# Patient Record
Sex: FEMALE | URBAN - METROPOLITAN AREA
[De-identification: names, ages, dates, MRNs, and addresses within clinical notes are randomized per-mention and may not be internally consistent; named-entity substitution may affect disease eponyms.]

---

## 2023-09-25 ENCOUNTER — APPOINTMENT (RX ONLY)
Dept: URBAN - METROPOLITAN AREA CLINIC 28 | Facility: CLINIC | Age: 78
Setting detail: DERMATOLOGY
End: 2023-09-25

## 2023-09-25 DIAGNOSIS — Z71.89 OTHER SPECIFIED COUNSELING: ICD-10-CM

## 2023-09-25 DIAGNOSIS — L82.1 OTHER SEBORRHEIC KERATOSIS: ICD-10-CM

## 2023-09-25 DIAGNOSIS — D22 MELANOCYTIC NEVI: ICD-10-CM

## 2023-09-25 DIAGNOSIS — L71.8 OTHER ROSACEA: ICD-10-CM

## 2023-09-25 DIAGNOSIS — D18.0 HEMANGIOMA: ICD-10-CM

## 2023-09-25 DIAGNOSIS — L21.8 OTHER SEBORRHEIC DERMATITIS: ICD-10-CM

## 2023-09-25 DIAGNOSIS — Z80.8 FAMILY HISTORY OF MALIGNANT NEOPLASM OF OTHER ORGANS OR SYSTEMS: ICD-10-CM

## 2023-09-25 DIAGNOSIS — Z85.828 PERSONAL HISTORY OF OTHER MALIGNANT NEOPLASM OF SKIN: ICD-10-CM

## 2023-09-25 DIAGNOSIS — L81.4 OTHER MELANIN HYPERPIGMENTATION: ICD-10-CM

## 2023-09-25 DIAGNOSIS — Z12.83 ENCOUNTER FOR SCREENING FOR MALIGNANT NEOPLASM OF SKIN: ICD-10-CM

## 2023-09-25 PROBLEM — D18.01 HEMANGIOMA OF SKIN AND SUBCUTANEOUS TISSUE: Status: ACTIVE | Noted: 2023-09-25

## 2023-09-25 PROBLEM — D22.71 MELANOCYTIC NEVI OF RIGHT LOWER LIMB, INCLUDING HIP: Status: ACTIVE | Noted: 2023-09-25

## 2023-09-25 PROBLEM — D22.5 MELANOCYTIC NEVI OF TRUNK: Status: ACTIVE | Noted: 2023-09-25

## 2023-09-25 PROCEDURE — ? OBSERVATION

## 2023-09-25 PROCEDURE — 99204 OFFICE O/P NEW MOD 45 MIN: CPT

## 2023-09-25 PROCEDURE — ? ADDITIONAL NOTES

## 2023-09-25 PROCEDURE — ? FULL BODY SKIN EXAM

## 2023-09-25 PROCEDURE — ? PRESCRIPTION

## 2023-09-25 PROCEDURE — ? COUNSELING

## 2023-09-25 PROCEDURE — ? SUNSCREEN RECOMMENDATIONS

## 2023-09-25 PROCEDURE — ? PRESCRIPTION MEDICATION MANAGEMENT

## 2023-09-25 RX ORDER — METRONIDAZOLE 7.5 MG/G
1 CREAM TOPICAL BID
Qty: 45 | Refills: 6 | Status: ERX | COMMUNITY
Start: 2023-09-25

## 2023-09-25 RX ORDER — KETOCONAZOLE 20 MG/ML
1 SHAMPOO, SUSPENSION TOPICAL QDAY
Qty: 120 | Refills: 3 | Status: ERX | COMMUNITY
Start: 2023-09-25

## 2023-09-25 RX ADMIN — METRONIDAZOLE 1: 7.5 CREAM TOPICAL at 00:00

## 2023-09-25 RX ADMIN — KETOCONAZOLE 1: 20 SHAMPOO, SUSPENSION TOPICAL at 00:00

## 2023-09-25 ASSESSMENT — LOCATION DETAILED DESCRIPTION DERM
LOCATION DETAILED: SUPERIOR THORACIC SPINE
LOCATION DETAILED: RIGHT MID-UPPER BACK
LOCATION DETAILED: RIGHT MEDIAL 4TH TOE
LOCATION DETAILED: LEFT INFERIOR CENTRAL MALAR CHEEK
LOCATION DETAILED: LEFT MEDIAL FRONTAL SCALP
LOCATION DETAILED: RIGHT SUPERIOR MEDIAL UPPER BACK
LOCATION DETAILED: LEFT CENTRAL PARIETAL SCALP
LOCATION DETAILED: LEFT CLAVICULAR NECK
LOCATION DETAILED: LEFT SUPERIOR UPPER BACK

## 2023-09-25 ASSESSMENT — LOCATION ZONE DERM
LOCATION ZONE: TRUNK
LOCATION ZONE: SCALP
LOCATION ZONE: TOE
LOCATION ZONE: NECK
LOCATION ZONE: FACE

## 2023-09-25 ASSESSMENT — LOCATION SIMPLE DESCRIPTION DERM
LOCATION SIMPLE: RIGHT UPPER BACK
LOCATION SIMPLE: SCALP
LOCATION SIMPLE: LEFT SCALP
LOCATION SIMPLE: UPPER BACK
LOCATION SIMPLE: LEFT UPPER BACK
LOCATION SIMPLE: RIGHT 4TH TOE
LOCATION SIMPLE: LEFT CHEEK
LOCATION SIMPLE: LEFT ANTERIOR NECK

## 2023-09-25 NOTE — PROCEDURE: ADDITIONAL NOTES
Render Risk Assessment In Note?: no
Detail Level: Simple
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

## 2023-09-25 NOTE — PROCEDURE: PRESCRIPTION MEDICATION MANAGEMENT
Detail Level: Zone
Render In Strict Bullet Format?: No
Initiate Treatment: metronidazole 0.75% topical cream: apply thin layer to affected areas on face BID
Initiate Treatment: ketoconazole 2% shampoo: During each hair wash Massage a thin layer onto scalp, let sit for 5-10 minutes then rinse

## 2023-09-25 NOTE — HPI: EVALUATION OF SKIN LESION(S)
What Type Of Note Output Would You Prefer (Optional)?: Bullet Format
Hpi Title: Evaluation of Skin Lesions
How Severe Are Your Spot(S)?: moderate
Family Member: Father
Additional History: Pt father passed away from stage 4 malignant melanoma. 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

## 2024-02-07 ENCOUNTER — PRE-ADMISSION TESTING (OUTPATIENT)
Dept: PREADMISSION TESTING | Age: 79
End: 2024-02-07
Payer: MEDICARE

## 2024-02-07 VITALS — WEIGHT: 175 LBS | HEIGHT: 66 IN | BODY MASS INDEX: 28.12 KG/M2

## 2024-02-07 RX ORDER — CARBOXYMETHYLCELLULOSE SODIUM 5 MG/ML
1 SOLUTION/ DROPS OPHTHALMIC AS NEEDED
COMMUNITY

## 2024-02-07 RX ORDER — ROSUVASTATIN CALCIUM 5 MG/1
5 TABLET, COATED ORAL EVERY EVENING
COMMUNITY

## 2024-02-07 RX ORDER — FLUTICASONE PROPIONATE 50 MCG
1 SPRAY, SUSPENSION (ML) NASAL DAILY
COMMUNITY

## 2024-02-07 RX ORDER — VENLAFAXINE HYDROCHLORIDE 150 MG/1
225 CAPSULE, EXTENDED RELEASE ORAL DAILY
COMMUNITY

## 2024-02-07 RX ORDER — LISINOPRIL 10 MG/1
10 TABLET ORAL EVERY EVENING
COMMUNITY

## 2024-02-07 RX ORDER — VITAMIN A 3000 MCG
10000 CAPSULE ORAL EVERY EVENING
COMMUNITY
End: 2024-02-21 | Stop reason: ENTERED-IN-ERROR

## 2024-02-07 ASSESSMENT — PAIN SCALES - GENERAL: PAINLEVEL: 0-NO PAIN

## 2024-02-07 NOTE — PRE-PROCEDURE INSTRUCTIONS
1.       Admissions will call you with your arrival time on  February 26, 2024 (day prior to surgery) between 2pm-4pm.  For questions about your arrival time, please call [phone].    2.       On the day of your procedure please report to the [facility] in the [place]. Please arrive through the [facility] Entrance.  If you are parking in the [facility], come to the ground floor of the garage and follow signs to the [facility].  After being screened, please report to either the Outpatient Registration for Pre-Admission Testing or to the Surgery Registration Desk.    3. Please follow the following fasting guidelines:     Nothing to eat or drink after midnight      4. Please take ONLY the following medications with a sip of water on the morning of your procedure: (populate names and/or NONE)  Venlazafine and Flonase    5. Other Instructions: You may brush your teeth the morning of the procedure. Rinse and spit, do not swallow.   Use surgical wash as directed. STOP all Motrin, Aleve, Ibuprofen, Advil, Aspirin, Vitamins and Herbal supplements 7 days prior. Tylenol is OK    6. If you develop a cold, cough, fever, rash, or other symptom prior to the day of the procedure, please report it to your physician immediately.    7. If you need to cancel the procedure for any reason, please contact your physician.    8. Make arrangements to have safe transportation home accompanied by a responsible adult. If you have not arranged safe transportation home, your surgery will be cancelled. Safe transportation may include private vehicle, ride-share service, taxi and public transportation when accompanied by a responsible adult who will assist you home. A responsible adult is someone known to you and does not include the taxi, ride-share or public transit drive transporting you.    9.  If it is medically necessary for you to have a longer stay, you will be informed as soon as the decision is  made.    10. Only bring essential items to the hospital.  Do not wear or bring anything of value to the hospital including jewelry of any kind, money, or wallet. Do not wear make-up or contact lenses.  DO NOT BRING MEDICATIONS FROM HOME unless instructed to do so. DO bring your hearing aids, glasses, and a case    11. No lotion, creams, powders, or oils on skin the morning of procedure     12. Dress in comfortable clothes.    13.  If instructed, please bring a copy of your Advanced Directive (Living Will/Durable Power of ) on the day of your procedure.     14. Ensuring your safety at all times is a very important part of our [facility] Culture of Safety. After having surgery and sedation, you are at risk for falling and balance issues. Although you may feel awake, the effects of the medication can last up to 24 hours after anesthesia. If you need to use the bathroom during your recovery period, nursing staff will escort you there and stay with you to ensure your safety.    15. Refrain from drinking alcohol and smoking cigarettes for 24 hours prior to surgery.    16. Shower with antibacterial soap (Dial) the night before and morning of your procedure.  If your procedure indicates the need for CHG antiseptic wash (Bactoshield or Hibiclens), please use this instead and follow instructions as discussed at the time of your Pre-Admission Testing phone interview or visit.    Above instructions reviewed with patient and patient acknowledges understanding.    Form explained by: Lachelle Monroe RN

## 2024-02-21 ENCOUNTER — PRE-ADMISSION TESTING (OUTPATIENT)
Dept: PREADMISSION TESTING | Facility: HOSPITAL | Age: 79
End: 2024-02-21
Attending: ORTHOPAEDIC SURGERY
Payer: MEDICARE

## 2024-02-21 ENCOUNTER — HOSPITAL ENCOUNTER (OUTPATIENT)
Dept: CARDIOLOGY | Facility: HOSPITAL | Age: 79
Discharge: HOME | End: 2024-02-21
Attending: ORTHOPAEDIC SURGERY
Payer: MEDICARE

## 2024-02-21 ENCOUNTER — TRANSCRIBE ORDERS (OUTPATIENT)
Dept: PREADMISSION TESTING | Facility: HOSPITAL | Age: 79
End: 2024-02-21

## 2024-02-21 VITALS
HEIGHT: 66 IN | RESPIRATION RATE: 18 BRPM | SYSTOLIC BLOOD PRESSURE: 118 MMHG | BODY MASS INDEX: 28.12 KG/M2 | TEMPERATURE: 96.4 F | HEART RATE: 80 BPM | OXYGEN SATURATION: 94 % | WEIGHT: 175 LBS | DIASTOLIC BLOOD PRESSURE: 78 MMHG

## 2024-02-21 DIAGNOSIS — Z01.818 ENCOUNTER FOR OTHER PREPROCEDURAL EXAMINATION: ICD-10-CM

## 2024-02-21 DIAGNOSIS — Z01.818 ENCOUNTER FOR PRE-OPERATIVE EXAMINATION: ICD-10-CM

## 2024-02-21 DIAGNOSIS — R94.31 ABNORMAL EKG: ICD-10-CM

## 2024-02-21 DIAGNOSIS — I10 PRIMARY HYPERTENSION: ICD-10-CM

## 2024-02-21 DIAGNOSIS — M17.12 UNILATERAL PRIMARY OSTEOARTHRITIS, LEFT KNEE: ICD-10-CM

## 2024-02-21 DIAGNOSIS — M17.12 UNILATERAL PRIMARY OSTEOARTHRITIS, LEFT KNEE: Primary | ICD-10-CM

## 2024-02-21 DIAGNOSIS — F32.A DEPRESSION, UNSPECIFIED DEPRESSION TYPE: ICD-10-CM

## 2024-02-21 DIAGNOSIS — E78.5 HYPERLIPIDEMIA, UNSPECIFIED HYPERLIPIDEMIA TYPE: ICD-10-CM

## 2024-02-21 DIAGNOSIS — E83.52 HYPERCALCEMIA: ICD-10-CM

## 2024-02-21 LAB
ALBUMIN SERPL-MCNC: 4.5 G/DL (ref 3.5–5.7)
ALP SERPL-CCNC: 57 IU/L (ref 34–125)
ALT SERPL-CCNC: 14 IU/L (ref 7–52)
ANION GAP SERPL CALC-SCNC: 6 MEQ/L (ref 3–15)
AST SERPL-CCNC: 20 IU/L (ref 13–39)
BASOPHILS # BLD: 0.06 K/UL (ref 0.01–0.1)
BASOPHILS NFR BLD: 0.9 %
BILIRUB SERPL-MCNC: 0.3 MG/DL (ref 0.3–1.2)
BUN SERPL-MCNC: 20 MG/DL (ref 7–25)
CALCIUM SERPL-MCNC: 10.5 MG/DL (ref 8.6–10.3)
CHLORIDE SERPL-SCNC: 104 MEQ/L (ref 98–107)
CO2 SERPL-SCNC: 30 MEQ/L (ref 21–31)
CREAT SERPL-MCNC: 1.1 MG/DL (ref 0.6–1.2)
DIFFERENTIAL METHOD BLD: ABNORMAL
EGFRCR SERPLBLD CKD-EPI 2021: 50.9 ML/MIN/1.73M*2
EOSINOPHIL # BLD: 0.12 K/UL (ref 0.04–0.36)
EOSINOPHIL NFR BLD: 1.7 %
ERYTHROCYTE [DISTWIDTH] IN BLOOD BY AUTOMATED COUNT: 13.1 % (ref 11.7–14.4)
GLUCOSE SERPL-MCNC: 94 MG/DL (ref 70–99)
HCT VFR BLD AUTO: 43.3 % (ref 35–45)
HGB BLD-MCNC: 14.5 G/DL (ref 11.8–15.7)
IMM GRANULOCYTES # BLD AUTO: 0.02 K/UL (ref 0–0.08)
IMM GRANULOCYTES NFR BLD AUTO: 0.3 %
LYMPHOCYTES # BLD: 2.49 K/UL (ref 1.2–3.5)
LYMPHOCYTES NFR BLD: 35.9 %
MCH RBC QN AUTO: 31 PG (ref 28–33.2)
MCHC RBC AUTO-ENTMCNC: 33.5 G/DL (ref 32.2–35.5)
MCV RBC AUTO: 92.7 FL (ref 83–98)
MONOCYTES # BLD: 0.58 K/UL (ref 0.28–0.8)
MONOCYTES NFR BLD: 8.4 %
NEUTROPHILS # BLD: 3.67 K/UL (ref 1.7–7)
NEUTS SEG NFR BLD: 52.8 %
NRBC BLD-RTO: 0 %
PDW BLD AUTO: 9.3 FL (ref 9.4–12.3)
PLATELET # BLD AUTO: 262 K/UL (ref 150–369)
POTASSIUM SERPL-SCNC: 4.5 MEQ/L (ref 3.5–5.1)
PROT SERPL-MCNC: 6.5 G/DL (ref 6–8.2)
RBC # BLD AUTO: 4.67 M/UL (ref 3.93–5.22)
SODIUM SERPL-SCNC: 140 MEQ/L (ref 136–145)
WBC # BLD AUTO: 6.94 K/UL (ref 3.8–10.5)

## 2024-02-21 PROCEDURE — 99204 OFFICE O/P NEW MOD 45 MIN: CPT | Performed by: HOSPITALIST

## 2024-02-21 PROCEDURE — 99205 OFFICE O/P NEW HI 60 MIN: CPT | Performed by: INTERNAL MEDICINE

## 2024-02-21 PROCEDURE — 80053 COMPREHEN METABOLIC PANEL: CPT

## 2024-02-21 PROCEDURE — 36415 COLL VENOUS BLD VENIPUNCTURE: CPT

## 2024-02-21 PROCEDURE — 85025 COMPLETE CBC W/AUTO DIFF WBC: CPT

## 2024-02-21 PROCEDURE — 93005 ELECTROCARDIOGRAM TRACING: CPT

## 2024-02-21 NOTE — ASSESSMENT & PLAN NOTE
EKG shows normal sinus rhythm with Q waves in inferior leads  She was seen by cardiology today cleared for surgery as she does not have any any active cardiac symptoms

## 2024-02-21 NOTE — CONSULTS
[facility] Cardiology  [facility] Consult Note       Reason for consult: Abnormal EKG, preoperative cardiovascular risk assessment     HPI     Bessy Newman is a 78 y.o. female with a past medical history significant for hypertension, hyperlipidemia, and depression.    She is scheduled for a left knee arthroplasty on 2/27/2024 with Dr. Aspen Woody.  Our service was consulted for preoperative cardiovascular risk assessment in the context of an abnormal EKG.    On interview she reports previously seeing a cardiologist while in [place].  She says she moved to this area approximately 3 years ago and was never recommended to follow-up with cardiology.  She says approximately 2 years ago she was seen at [facility] emergency department due to chest pain, she describes a sensation of feeling of elephant sitting on her chest.  She says her workup in the emergency department at that time was largely unremarkable and she was advised to follow-up with her PCP.  She says following this episode she did follow-up with her PCP and was not referred to cardiology.  In general she denies any episodes of chest pain, shortness of breath, palpitations, dizziness, lightheadedness, orthopnea, PND, presyncope/syncope, or LE edema.  She does report having a COVID infection in December 2023 and complains of some significant fatigue following her COVID infection.  She says approximately 1 week ago she walked for a mile, approximately 45 minutes and felt extremely fatigued after this.  She says prior to her COVID infection in December she was able to walk without any significant fatigue and felt well.  She attributed her fatigue due to deconditioning as she has been less active recently.    She does not have stairs at home but does have a flight of stairs at her cafeteria at her living facility.  She says she will take the stairs from time to time, recently within the last week without any significant symptoms of chest pain  or shortness of breath.  She says she has a longstanding history of hypertension and hyperlipidemia which has been under good control.  She denies any significant personal history of tobacco use but says she lives with someone who smoked for many years.  She denied any family history of premature CAD.    No prior EKG is available for review however there was a reading from an EKG that was obtained in January 2022 which listed an anterior infarct, the physical tracing is not available for personal review.    ROS: As noted per HPI    Past History      Past Medical History:   Diagnosis Date   • Arthritis    • Bronchitis    • Depression    • Dry eye    • Hypertension    • Kidney stone    • Lipid disorder    • Migraine headache    • Osteopenia    • Seasonal allergies        Past Surgical History:   Procedure Laterality Date   • BUNIONECTOMY Right    • CATARACT EXTRACTION BILATERAL W/ ANTERIOR VITRECTOMY     • HAND SURGERY Right    • KNEE ARTHROSCOPY Bilateral    • ROTATOR CUFF REPAIR Left     2 procedures   • TOTAL SHOULDER ARTHROPLASTY Right 2008   • WRIST SURGERY Left     with hardware       Social History     Social History Narrative   • Not on file       Family History   Problem Relation Age of Onset   • Glaucoma Biological Mother    • Cancer Biological Father    • No Known Problems Biological Brother         Medications     Medications prior to admission:  No medications prior to admission.       Scheduled Inpatient Medications:      Scheduled Inpatient Infusions:  No current facility-administered medications for this encounter.        Allergies     Patient has no known allergies.     Vital Signs/Exam     Vital signs in last 24 hours:  Temp:  [35.8 °C (96.4 °F)] 35.8 °C (96.4 °F)  Heart Rate:  [80] 80  Resp:  [18] 18  BP: (118)/(78) 118/78    I/O  No intake or output data in the 24 hours ending 02/21/24 1447    Weight  Weight change:     Physical Exam:  There were no vitals taken for this visit.    Gen: no  "distress  HEENT: no jvd, no carotid bruits   Lungs: clear bilaterally; no crackles, rhonchi, wheezing  Chest wall: no tenderness  Heart: regular rate and rhythm; no murmur  Abdomen: nondistended, nontender  Extremities: no edema  Neuro: nonfocal, alert and oriented  Psych: normal mood and affect     Diagnostic Data   Diagnostic data personally reviewed.    Labs:              No results found for: \"HSTROPONINI\"                            Imaging last 24 hrs:   No results found.    Vascular Studies:  No results found for this or any previous visit from the past 365 days.      Peripheral:  No results found for this or any previous visit from the past 365 days.      Stress Tests:             Cardiac cath:      Echocardiogram:        ECG: Independently reviewed:          Assessment and Plan    CODE STATUS: No Order    There are no hospital problems to display for this patient.      Bessy Newman is a 78 y.o. female with a past medical history significant for hypertension, hyperlipidemia, and depression.    Outpatient cardiac medications: Crestor 5 mg daily, lisinopril 10 mg daily    Abnormal EKG  Preoperative cardiovascular risk assessment  -Planned for a left knee arthroplasty on 2/27/2024  -EKG showed sinus rhythm with first-degree AV delay NSSTWA, no prior physical tracing of EKG available for comparison, prior EKG reading from [place] in January 2022 reported anterior infarct  -Denies any anginal equivalent symptoms, cardiac exam is unremarkable  -Able to perform greater than 4 METS of activity without any significant symptoms  -In the absence of unstable angina, severe valvular disease, decompensated heart failure, or malignant arrhythmias she is at an acceptable risk for her planned surgery without any additional cardiac testing prior to surgery  -Given her abnormal EKG, discussed a routine outpatient follow-up with cardiology, this does not need to be completed prior to her planned surgery    Hypertension  -BP " stable today  -Hold lisinopril morning of surgery    Hyperlipidemia  -Continue statin    Thank you for this consultation.      Patient was seen and evaluated in conjunction with Dr. Michael Valentino Caroline Schon, CRNP    [facility] Cardiology, Main Office: [phone]  [facility]: [facility]   Pager #[phone]  2/21/2024

## 2024-02-21 NOTE — H&P (VIEW-ONLY)
[facility] Medicine Service -  Pre-Operative Consultation       Patient Name: Bessy Newman  Referring Surgeon: Dr Woody    Reason for Referral: Pre-Operative Evaluation  Surgical Procedure: Left Knee Arthroplasty  Operative Date: 2/27/24  Other Providers:      PCP: Nilda Neri MD          HISTORY OF PRESENT ILLNESS      Bessy Newman is a 78 y.o. female presenting today to the [facility] at [place] for pre-operative evaluation prior to planned surgery.    Complaining of left knee pain for the last 20 years and worse for the last 5 months  She tried all nonoperative measures without much improvement  She denies any exertional chest pain or sob and her exercise tolerance is > 4 mets  She denies any history of cad/mi/stroke  She is cleared by Dr Valentino from cardiology today    In regards to medical history:  Hypertension  Hyperlipidemia  Depression    The patient denies any current or recent chest pain or pressure, dyspnea, cough, sputum, fevers, chills, abdominal pain, nausea, vomiting, diarrhea or other symptoms.     Functionally, the patient is able to ascend a flight or so of stairs with no dyspnea or chest pain.     The patient denies, on specific questioning, the following:  No history of MI, arrhythmia,or CHF.  No history of CHELSIE.  No history of DVT/PE.  No history of COPD.  No history of CVA.  No history of DM.   No history of CKD.     PAST MEDICAL AND SURGICAL HISTORY      Past Medical History:   Diagnosis Date   • Arthritis    • Bronchitis    • Depression    • Dry eye    • Hypertension    • Kidney stone    • Lipid disorder    • Migraine headache    • Osteopenia    • Seasonal allergies        Past Surgical History:   Procedure Laterality Date   • BUNIONECTOMY Right    • CATARACT EXTRACTION BILATERAL W/ ANTERIOR VITRECTOMY     • HAND SURGERY Right    • KNEE ARTHROSCOPY Bilateral    • ROTATOR CUFF REPAIR Left     2 procedures   • TOTAL  "SHOULDER ARTHROPLASTY Right 2008   • WRIST SURGERY Left     with hardware       MEDICATIONS        Current Outpatient Medications:   •  carboxymethylcellulose (REFRESH PLUS) 0.5 % dropperette, Administer 1 drop into both eyes as needed for dry eyes., Disp: , Rfl:   •  fluticasone propionate (FLONASE) 50 mcg/actuation nasal spray, Administer 1 spray into each nostril daily., Disp: , Rfl:   •  lisinopriL (PRINIVIL) 10 mg tablet, Take 10 mg by mouth every evening., Disp: , Rfl:   •  rosuvastatin (CRESTOR) 5 mg tablet, Take 5 mg by mouth every evening., Disp: , Rfl:   •  venlafaxine XR (EFFEXOR-XR) 150 mg 24 hr capsule, Take 225 mg by mouth daily., Disp: , Rfl:     ALLERGIES      Patient has no known allergies.    FAMILY HISTORY      family history includes Cancer in her biological father; Glaucoma in her biological mother; No Known Problems in her biological brother.    Denies any prior known family history of DVTs/PEs/clotting disorder    SOCIAL HISTORY      Social History     Tobacco Use   • Smoking status: Never   • Smokeless tobacco: Never   Vaping Use   • Vaping Use: Never used   Substance Use Topics   • Alcohol use: Never   • Drug use: Never       REVIEW OF SYSTEMS      All other systems reviewed and negative except as noted in HPI    PHYSICAL EXAMINATION      Visit Vitals  /78 (BP Location: Right upper arm, Patient Position: Sitting)   Pulse 80   Temp (!) 35.8 °C (96.4 °F) (Temporal)   Resp 18   Ht 1.676 m (5' 6\")   Wt 79.4 kg (175 lb)   SpO2 94%   BMI 28.25 kg/m²     Body mass index is 28.25 kg/m².    Physical Exam  General appearance: alert, appears stated age, cooperative, non-toxic  Head: normocephalic, without obvious abnormality, atraumatic  Eyes: conjunctivae clear. PERRL, EOMI's intact.  Lungs: clear to auscultation bilaterally   Heart: regular rate and rhythm, S1, S2 normal,   Abdomen: Nondistended, +BS, soft, non-tender, no masses palpable   Extremities: left knee rom limited secondary to " pain  Pulses: 2+ and symmetric B/L DP  Neurologic: Alert and oriented X 3, no focal deficits  Skin: intact, no rashes or lesions      LABS / EKG        Labs      Lab Results   Component Value Date     02/21/2024    K 4.5 02/21/2024     02/21/2024    BUN 20 02/21/2024    CREATININE 1.1 02/21/2024    WBC 6.94 02/21/2024    HGB 14.5 02/21/2024    HCT 43.3 02/21/2024     02/21/2024    ALT 14 02/21/2024    AST 20 02/21/2024         ECG/Telemetry  I have independently reviewed the ECG. Significant findings include Normal sinus rhythm with Q waves in inferior leads.    ASSESSMENT AND PLAN         Encounter for pre-operative examination  Scheduled to have Left Knee Medial Unticompartmental Arthroplasty, Possible Total Knee Arthroplasty by Dr Woody on 2/27/24  Complaining of left knee pain for the last 20 years and worse for the last 5 months  She tried all nonoperative measures without much improvement  She denies any exertional chest pain or sob and her exercise tolerance is > 4 mets. She is cleared by Dr Valentino from cardiology today for the upcoming procedure.   She denies any history of cad/mi/stroke  She denies any bowel or bladder disturbance  She denies any history of dvt/pe  She denies any snoring  EKG showed normal sinus rhythm with inferior q waves       Depression  Continue Effexor    Hyperlipidemia  Continue Crestor    Primary hypertension  Hold lisinopril on the morning of surgery    Abnormal EKG  EKG shows normal sinus rhythm with Q waves in inferior leads  She was seen by cardiology today cleared for surgery as she does not have any any active cardiac symptoms    Hypercalcemia  Patient noted to have mild hypercalcemia- chronic and stable       In regards to perioperative cardiac risk:  Regarding perioperative cardiovascular risk:  The patient has the following risk factors: none.  This correlates to a rCRI score of 0 with perioperative cardiac event risk of 0.4%.  Knee arthroplasty  is an intermediate risk procedure and she is at acceptable risk for surgery      Further comments:  Resume supplements when OK with surgical team.  I would encourage incentive spirometry to assist with minimizing kirk-operative pulmonary risk.  DVT prophylaxis and timing of such per the discretion of the surgeon.     Please do not hesitate to contact [facility] during the upcoming hospitalization with any questions or concerns.     Bonifacio Turk MD  2/22/2024

## 2024-02-21 NOTE — CONSULTS
[facility] Medicine Service -  Pre-Operative Consultation       Patient Name: Bessy Newman  Referring Surgeon: Dr Woody    Reason for Referral: Pre-Operative Evaluation  Surgical Procedure: Left Knee Arthroplasty  Operative Date: 2/27/24  Other Providers:      PCP: Nilda Neri MD          HISTORY OF PRESENT ILLNESS      Bessy Newman is a 78 y.o. female presenting today to the [facility] at [place] for pre-operative evaluation prior to planned surgery.    Complaining of left knee pain for the last 20 years and worse for the last 5 months  She tried all nonoperative measures without much improvement  She denies any exertional chest pain or sob and her exercise tolerance is > 4 mets  She denies any history of cad/mi/stroke  She is cleared by Dr Valentino from cardiology today    In regards to medical history:  Hypertension  Hyperlipidemia  Depression    The patient denies any current or recent chest pain or pressure, dyspnea, cough, sputum, fevers, chills, abdominal pain, nausea, vomiting, diarrhea or other symptoms.     Functionally, the patient is able to ascend a flight or so of stairs with no dyspnea or chest pain.     The patient denies, on specific questioning, the following:  No history of MI, arrhythmia,or CHF.  No history of CHELSIE.  No history of DVT/PE.  No history of COPD.  No history of CVA.  No history of DM.   No history of CKD.     PAST MEDICAL AND SURGICAL HISTORY      Past Medical History:   Diagnosis Date   • Arthritis    • Bronchitis    • Depression    • Dry eye    • Hypertension    • Kidney stone    • Lipid disorder    • Migraine headache    • Osteopenia    • Seasonal allergies        Past Surgical History:   Procedure Laterality Date   • BUNIONECTOMY Right    • CATARACT EXTRACTION BILATERAL W/ ANTERIOR VITRECTOMY     • HAND SURGERY Right    • KNEE ARTHROSCOPY Bilateral    • ROTATOR CUFF REPAIR Left     2 procedures   • TOTAL  "SHOULDER ARTHROPLASTY Right 2008   • WRIST SURGERY Left     with hardware       MEDICATIONS        Current Outpatient Medications:   •  carboxymethylcellulose (REFRESH PLUS) 0.5 % dropperette, Administer 1 drop into both eyes as needed for dry eyes., Disp: , Rfl:   •  fluticasone propionate (FLONASE) 50 mcg/actuation nasal spray, Administer 1 spray into each nostril daily., Disp: , Rfl:   •  lisinopriL (PRINIVIL) 10 mg tablet, Take 10 mg by mouth every evening., Disp: , Rfl:   •  rosuvastatin (CRESTOR) 5 mg tablet, Take 5 mg by mouth every evening., Disp: , Rfl:   •  venlafaxine XR (EFFEXOR-XR) 150 mg 24 hr capsule, Take 225 mg by mouth daily., Disp: , Rfl:     ALLERGIES      Patient has no known allergies.    FAMILY HISTORY      family history includes Cancer in her biological father; Glaucoma in her biological mother; No Known Problems in her biological brother.    Denies any prior known family history of DVTs/PEs/clotting disorder    SOCIAL HISTORY      Social History     Tobacco Use   • Smoking status: Never   • Smokeless tobacco: Never   Vaping Use   • Vaping Use: Never used   Substance Use Topics   • Alcohol use: Never   • Drug use: Never       REVIEW OF SYSTEMS      All other systems reviewed and negative except as noted in HPI    PHYSICAL EXAMINATION      Visit Vitals  /78 (BP Location: Right upper arm, Patient Position: Sitting)   Pulse 80   Temp (!) 35.8 °C (96.4 °F) (Temporal)   Resp 18   Ht 1.676 m (5' 6\")   Wt 79.4 kg (175 lb)   SpO2 94%   BMI 28.25 kg/m²     Body mass index is 28.25 kg/m².    Physical Exam  General appearance: alert, appears stated age, cooperative, non-toxic  Head: normocephalic, without obvious abnormality, atraumatic  Eyes: conjunctivae clear. PERRL, EOMI's intact.  Lungs: clear to auscultation bilaterally   Heart: regular rate and rhythm, S1, S2 normal,   Abdomen: Nondistended, +BS, soft, non-tender, no masses palpable   Extremities: left knee rom limited secondary to " pain  Pulses: 2+ and symmetric B/L DP  Neurologic: Alert and oriented X 3, no focal deficits  Skin: intact, no rashes or lesions      LABS / EKG        Labs      Lab Results   Component Value Date     02/21/2024    K 4.5 02/21/2024     02/21/2024    BUN 20 02/21/2024    CREATININE 1.1 02/21/2024    WBC 6.94 02/21/2024    HGB 14.5 02/21/2024    HCT 43.3 02/21/2024     02/21/2024    ALT 14 02/21/2024    AST 20 02/21/2024         ECG/Telemetry  I have independently reviewed the ECG. Significant findings include Normal sinus rhythm with Q waves in inferior leads.    ASSESSMENT AND PLAN         Encounter for pre-operative examination  Scheduled to have Left Knee Medial Unticompartmental Arthroplasty, Possible Total Knee Arthroplasty by Dr Woody on 2/27/24  Complaining of left knee pain for the last 20 years and worse for the last 5 months  She tried all nonoperative measures without much improvement  She denies any exertional chest pain or sob and her exercise tolerance is > 4 mets. She is cleared by Dr Valentino from cardiology today for the upcoming procedure.   She denies any history of cad/mi/stroke  She denies any bowel or bladder disturbance  She denies any history of dvt/pe  She denies any snoring  EKG showed normal sinus rhythm with inferior q waves       Depression  Continue Effexor    Hyperlipidemia  Continue Crestor    Primary hypertension  Hold lisinopril on the morning of surgery    Abnormal EKG  EKG shows normal sinus rhythm with Q waves in inferior leads  She was seen by cardiology today cleared for surgery as she does not have any any active cardiac symptoms    Hypercalcemia  Patient noted to have mild hypercalcemia- chronic and stable       In regards to perioperative cardiac risk:  Regarding perioperative cardiovascular risk:  The patient has the following risk factors: none.  This correlates to a rCRI score of 0 with perioperative cardiac event risk of 0.4%.  Knee arthroplasty  is an intermediate risk procedure and she is at acceptable risk for surgery      Further comments:  Resume supplements when OK with surgical team.  I would encourage incentive spirometry to assist with minimizing kirk-operative pulmonary risk.  DVT prophylaxis and timing of such per the discretion of the surgeon.     Please do not hesitate to contact [facility] during the upcoming hospitalization with any questions or concerns.     Bonifacio Turk MD  2/22/2024

## 2024-02-21 NOTE — ASSESSMENT & PLAN NOTE
Scheduled to have Left Knee Medial Unticompartmental Arthroplasty, Possible Total Knee Arthroplasty by Dr Woody on 2/27/24  Complaining of left knee pain for the last 20 years and worse for the last 5 months  She tried all nonoperative measures without much improvement  She denies any exertional chest pain or sob and her exercise tolerance is > 4 mets. She is cleared by Dr Valentino from cardiology today for the upcoming procedure.   She denies any history of cad/mi/stroke  She denies any bowel or bladder disturbance  She denies any history of dvt/pe  She denies any snoring  EKG showed normal sinus rhythm with inferior q waves

## 2024-02-22 PROBLEM — E83.52 HYPERCALCEMIA: Status: ACTIVE | Noted: 2024-02-22

## 2024-02-22 LAB
ATRIAL RATE: 88
P AXIS: 59
PR INTERVAL: 224
QRS DURATION: 74
QT INTERVAL: 338
QTC CALCULATION(BAZETT): 408
R AXIS: -25
T WAVE AXIS: 53
VENTRICULAR RATE: 88

## 2024-02-23 NOTE — DISCHARGE INSTRUCTIONS
Please see Dr. Woody's arthroplasty informational booklet given to you in the office.     Please take Keflex 500mg three times a day for 2 weeks for infection prophylaxis.     heartburn symptoms:  - follow-up with her PCP.  - Medicine recommends PPI (proton pump inhibitor) for about 3 months and then GI evaluation if no improvement.     DVT Prophylaxis:  -Continue with Aspirin 81mg by mouth twice daily X 4 weeks for blood clot prevention.   - Please take omeprazole while taking aspirin to prevent GI upset.       Constipation/ Pain Med:   - Take your pain medication with food to prevent nausea  - Do not drive while taking pain medications.   - Pain medications may cause constipation.   - Drink plenty of fluids and eat fresh fruits and vegetables.  - Please take Senna-Colace as prescribed. Hold for loose stool. If you are having difficulties having a bowel movement or haven't had bowel movement in 2 days, please add over the counter miralax and take as directed on package.   - If you have not had a bowel movement in three days please call the office.    Incision Care:   - On March 5, you may take off your dressing and leave your incision open to air.   - However, if there is any drainage please keep covered with gauze and tape.  - Please keep your incision(s) clean and dry  - Make sure your incision(s) are checked at least twice daily for signs and symptoms of infection.   If any of the below should occur, please call the office:  - Drainage from incision site  - Opening of incisions  - Fevers greater than 101  - Flu-like symptoms  - Increased redness and/or tenderness  Please call office or go to emergency department if :  - Thigh/calf pain or swelling in legs  - Chest pain  - Chest congestion  - Problems with breathing.

## 2024-02-26 ENCOUNTER — ANESTHESIA EVENT (OUTPATIENT)
Dept: OPERATING ROOM | Facility: HOSPITAL | Age: 79
Setting detail: HOSPITAL OUTPATIENT SURGERY
End: 2024-02-26
Payer: MEDICARE

## 2024-02-26 RX ORDER — TRANEXAMIC ACID 10 MG/ML
1000 INJECTION, SOLUTION INTRAVENOUS ONCE
Status: COMPLETED | OUTPATIENT
Start: 2024-02-27 | End: 2024-02-27

## 2024-02-26 ASSESSMENT — ENCOUNTER SYMPTOMS
HEADACHES: 1
DEPRESSION: 1

## 2024-02-26 NOTE — ANESTHESIA PREPROCEDURE EVALUATION
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  Past Surgical History:   Procedure Laterality Date   • BUNIONECTOMY Right    • CATARACT EXTRACTION BILATERAL W/ ANTERIOR VITRECTOMY     • HAND SURGERY Right    • KNEE ARTHROSCOPY Bilateral    • ROTATOR CUFF REPAIR Left     2 procedures   • TOTAL SHOULDER ARTHROPLASTY Right 2008   • WRIST SURGERY Left     with hardware       Physical Exam    Airway   Mallampati: II   TM distance: <3 FB   Neck ROM: full  Cardiovascular - normal   Rhythm: regular   Rate: normalPulmonary - normal   clear to auscultation  Dental - normal        Anesthesia Plan    Plan: spinal    Technique: spinal     Airway: natural airway / supplemental oxygen   2 ASA  Blood Products:     Use of Blood Products Discussed: Yes     Consented to blood products  Anesthetic plan and risks discussed with: patient  Postop Plan:   Patient Disposition: inpatient floor planned admission   Pain Management: IV analgesics and spinal

## 2024-02-27 ENCOUNTER — ANESTHESIA (OUTPATIENT)
Dept: OPERATING ROOM | Facility: HOSPITAL | Age: 79
Setting detail: HOSPITAL OUTPATIENT SURGERY
End: 2024-02-27
Payer: MEDICARE

## 2024-02-27 ENCOUNTER — APPOINTMENT (OUTPATIENT)
Dept: RADIOLOGY | Facility: HOSPITAL | Age: 79
End: 2024-02-27
Attending: NURSE PRACTITIONER
Payer: MEDICARE

## 2024-02-27 ENCOUNTER — HOSPITAL ENCOUNTER (OUTPATIENT)
Facility: HOSPITAL | Age: 79
Discharge: HOME | End: 2024-02-28
Attending: ORTHOPAEDIC SURGERY | Admitting: ORTHOPAEDIC SURGERY
Payer: MEDICARE

## 2024-02-27 DIAGNOSIS — Z98.890 S/P LEFT KNEE SURGERY: Primary | ICD-10-CM

## 2024-02-27 PROCEDURE — 25800000 HC PHARMACY IV SOLUTIONS: Performed by: ORTHOPAEDIC SURGERY

## 2024-02-27 PROCEDURE — 73560 X-RAY EXAM OF KNEE 1 OR 2: CPT | Mod: LT

## 2024-02-27 PROCEDURE — 63600000 HC DRUGS/DETAIL CODE: Mod: JZ | Performed by: ANESTHESIOLOGY

## 2024-02-27 PROCEDURE — 25800000 HC PHARMACY IV SOLUTIONS: Performed by: ANESTHESIOLOGY

## 2024-02-27 PROCEDURE — 27200000 HC STERILE SUPPLY: Performed by: ORTHOPAEDIC SURGERY

## 2024-02-27 PROCEDURE — 63600000 HC DRUGS/DETAIL CODE: Mod: JZ | Performed by: ORTHOPAEDIC SURGERY

## 2024-02-27 PROCEDURE — 36000015 HC OR LEVEL 5 EA ADDL MIN: Performed by: ORTHOPAEDIC SURGERY

## 2024-02-27 PROCEDURE — 25000000 HC PHARMACY GENERAL: Performed by: ORTHOPAEDIC SURGERY

## 2024-02-27 PROCEDURE — 63700000 HC SELF-ADMINISTRABLE DRUG: Performed by: NURSE PRACTITIONER

## 2024-02-27 PROCEDURE — C1713 ANCHOR/SCREW BN/BN,TIS/BN: HCPCS | Performed by: ORTHOPAEDIC SURGERY

## 2024-02-27 PROCEDURE — 25000000 HC PHARMACY GENERAL: Performed by: ANESTHESIOLOGY

## 2024-02-27 PROCEDURE — 71000001 HC PACU PHASE 1 INITIAL 30MIN: Performed by: ORTHOPAEDIC SURGERY

## 2024-02-27 PROCEDURE — 63600000 HC DRUGS/DETAIL CODE: Performed by: NURSE PRACTITIONER

## 2024-02-27 PROCEDURE — 37000010 HC ANESTHESIA SPINAL: Performed by: ORTHOPAEDIC SURGERY

## 2024-02-27 PROCEDURE — 63700000 HC SELF-ADMINISTRABLE DRUG

## 2024-02-27 PROCEDURE — 36000005 HC OR LEVEL 5 INITIAL 30MIN: Performed by: ORTHOPAEDIC SURGERY

## 2024-02-27 PROCEDURE — 63700000 HC SELF-ADMINISTRABLE DRUG: Performed by: ORTHOPAEDIC SURGERY

## 2024-02-27 PROCEDURE — 25800000 HC PHARMACY IV SOLUTIONS: Performed by: NURSE PRACTITIONER

## 2024-02-27 PROCEDURE — 0SRD0J9 REPLACEMENT OF LEFT KNEE JOINT WITH SYNTHETIC SUBSTITUTE, CEMENTED, OPEN APPROACH: ICD-10-PCS | Performed by: ORTHOPAEDIC SURGERY

## 2024-02-27 PROCEDURE — 63600000 HC DRUGS/DETAIL CODE: Performed by: ANESTHESIOLOGY

## 2024-02-27 PROCEDURE — 71000011 HC PACU PHASE 1 EA ADDL MIN: Performed by: ORTHOPAEDIC SURGERY

## 2024-02-27 PROCEDURE — C1776 JOINT DEVICE (IMPLANTABLE): HCPCS | Performed by: ORTHOPAEDIC SURGERY

## 2024-02-27 PROCEDURE — 99232 SBSQ HOSP IP/OBS MODERATE 35: CPT | Performed by: HOSPITALIST

## 2024-02-27 DEVICE — IMPLANTABLE DEVICE: Type: IMPLANTABLE DEVICE | Site: KNEE | Status: FUNCTIONAL

## 2024-02-27 DEVICE — FEMUR CRUCIATE RETAINING CEMENTED SZ 6 LEFT: Type: IMPLANTABLE DEVICE | Site: KNEE | Status: FUNCTIONAL

## 2024-02-27 DEVICE — BONE CEMENT R 1X40: Type: IMPLANTABLE DEVICE | Site: KNEE | Status: FUNCTIONAL

## 2024-02-27 DEVICE — COMPONENT TIBIAL STEM PERSONA: Type: IMPLANTABLE DEVICE | Site: KNEE | Status: FUNCTIONAL

## 2024-02-27 RX ORDER — POLYETHYLENE GLYCOL 3350 17 G/17G
17 POWDER, FOR SOLUTION ORAL DAILY
Status: DISCONTINUED | OUTPATIENT
Start: 2024-02-27 | End: 2024-02-28 | Stop reason: HOSPADM

## 2024-02-27 RX ORDER — ONDANSETRON HYDROCHLORIDE 2 MG/ML
INJECTION, SOLUTION INTRAVENOUS AS NEEDED
Status: DISCONTINUED | OUTPATIENT
Start: 2024-02-27 | End: 2024-02-27 | Stop reason: SURG

## 2024-02-27 RX ORDER — IBUPROFEN 200 MG
16-32 TABLET ORAL AS NEEDED
Status: DISCONTINUED | OUTPATIENT
Start: 2024-02-27 | End: 2024-02-28 | Stop reason: HOSPADM

## 2024-02-27 RX ORDER — LISINOPRIL 10 MG/1
10 TABLET ORAL EVERY EVENING
Status: DISCONTINUED | OUTPATIENT
Start: 2024-02-27 | End: 2024-02-28 | Stop reason: HOSPADM

## 2024-02-27 RX ORDER — DEXTROSE 50 % IN WATER (D50W) INTRAVENOUS SYRINGE
25 AS NEEDED
Status: DISCONTINUED | OUTPATIENT
Start: 2024-02-27 | End: 2024-02-28 | Stop reason: HOSPADM

## 2024-02-27 RX ORDER — ONDANSETRON HYDROCHLORIDE 2 MG/ML
4 INJECTION, SOLUTION INTRAVENOUS
Status: DISCONTINUED | OUTPATIENT
Start: 2024-02-27 | End: 2024-02-27 | Stop reason: HOSPADM

## 2024-02-27 RX ORDER — HYDROMORPHONE HYDROCHLORIDE 1 MG/ML
0.5 INJECTION, SOLUTION INTRAMUSCULAR; INTRAVENOUS; SUBCUTANEOUS
Status: DISCONTINUED | OUTPATIENT
Start: 2024-02-27 | End: 2024-02-27 | Stop reason: HOSPADM

## 2024-02-27 RX ORDER — BUPIVACAINE HYDROCHLORIDE 7.5 MG/ML
INJECTION, SOLUTION INTRASPINAL
Status: COMPLETED | OUTPATIENT
Start: 2024-02-27 | End: 2024-02-27

## 2024-02-27 RX ORDER — SODIUM CHLORIDE 9 MG/ML
INJECTION, SOLUTION INTRAVENOUS CONTINUOUS
Status: DISCONTINUED | OUTPATIENT
Start: 2024-02-27 | End: 2024-02-28 | Stop reason: HOSPADM

## 2024-02-27 RX ORDER — PREGABALIN 75 MG/1
75 CAPSULE ORAL
Status: DISCONTINUED | OUTPATIENT
Start: 2024-02-27 | End: 2024-02-28 | Stop reason: HOSPADM

## 2024-02-27 RX ORDER — FENTANYL CITRATE 50 UG/ML
50 INJECTION, SOLUTION INTRAMUSCULAR; INTRAVENOUS EVERY 5 MIN PRN
Status: COMPLETED | OUTPATIENT
Start: 2024-02-27 | End: 2024-02-27

## 2024-02-27 RX ORDER — FENTANYL CITRATE 50 UG/ML
INJECTION, SOLUTION INTRAMUSCULAR; INTRAVENOUS AS NEEDED
Status: DISCONTINUED | OUTPATIENT
Start: 2024-02-27 | End: 2024-02-27 | Stop reason: SURG

## 2024-02-27 RX ORDER — PREGABALIN 75 MG/1
CAPSULE ORAL
Status: DISPENSED
Start: 2024-02-27 | End: 2024-02-27

## 2024-02-27 RX ORDER — PROPOFOL 10 MG/ML
INJECTION, EMULSION INTRAVENOUS CONTINUOUS PRN
Status: DISCONTINUED | OUTPATIENT
Start: 2024-02-27 | End: 2024-02-27 | Stop reason: SURG

## 2024-02-27 RX ORDER — LABETALOL HCL 20 MG/4 ML
5 SYRINGE (ML) INTRAVENOUS AS NEEDED
Status: DISCONTINUED | OUTPATIENT
Start: 2024-02-27 | End: 2024-02-27 | Stop reason: HOSPADM

## 2024-02-27 RX ORDER — ONDANSETRON HYDROCHLORIDE 2 MG/ML
4 INJECTION, SOLUTION INTRAVENOUS EVERY 8 HOURS PRN
Status: DISCONTINUED | OUTPATIENT
Start: 2024-02-27 | End: 2024-02-28 | Stop reason: HOSPADM

## 2024-02-27 RX ORDER — SODIUM CHLORIDE, SODIUM GLUCONATE, SODIUM ACETATE, POTASSIUM CHLORIDE AND MAGNESIUM CHLORIDE 30; 37; 368; 526; 502 MG/100ML; MG/100ML; MG/100ML; MG/100ML; MG/100ML
INJECTION, SOLUTION INTRAVENOUS CONTINUOUS PRN
Status: DISCONTINUED | OUTPATIENT
Start: 2024-02-27 | End: 2024-02-27 | Stop reason: SURG

## 2024-02-27 RX ORDER — DEXTROSE 40 %
15-30 GEL (GRAM) ORAL AS NEEDED
Status: DISCONTINUED | OUTPATIENT
Start: 2024-02-27 | End: 2024-02-28 | Stop reason: HOSPADM

## 2024-02-27 RX ORDER — FLUTICASONE PROPIONATE 50 MCG
1 SPRAY, SUSPENSION (ML) NASAL DAILY
Status: DISCONTINUED | OUTPATIENT
Start: 2024-02-27 | End: 2024-02-28 | Stop reason: HOSPADM

## 2024-02-27 RX ORDER — BISACODYL 10 MG/1
10 SUPPOSITORY RECTAL DAILY PRN
Status: DISCONTINUED | OUTPATIENT
Start: 2024-02-27 | End: 2024-02-28 | Stop reason: HOSPADM

## 2024-02-27 RX ORDER — KETAMINE HYDROCHLORIDE 10 MG/ML
INJECTION, SOLUTION INTRAMUSCULAR; INTRAVENOUS CONTINUOUS PRN
Status: DISCONTINUED | OUTPATIENT
Start: 2024-02-27 | End: 2024-02-27 | Stop reason: SURG

## 2024-02-27 RX ORDER — SODIUM CHLORIDE 9 MG/ML
INJECTION INTRAMUSCULAR; INTRAVENOUS; SUBCUTANEOUS
Status: DISCONTINUED | OUTPATIENT
Start: 2024-02-27 | End: 2024-02-27 | Stop reason: HOSPADM

## 2024-02-27 RX ORDER — DEXAMETHASONE SODIUM PHOSPHATE 4 MG/ML
8 INJECTION, SOLUTION INTRA-ARTICULAR; INTRALESIONAL; INTRAMUSCULAR; INTRAVENOUS; SOFT TISSUE ONCE
Status: COMPLETED | OUTPATIENT
Start: 2024-02-28 | End: 2024-02-28

## 2024-02-27 RX ORDER — DEXTROSE 40 %
15-30 GEL (GRAM) ORAL AS NEEDED
Status: DISCONTINUED | OUTPATIENT
Start: 2024-02-27 | End: 2024-02-27 | Stop reason: HOSPADM

## 2024-02-27 RX ORDER — LIDOCAINE HYDROCHLORIDE 10 MG/ML
INJECTION, SOLUTION INFILTRATION; PERINEURAL AS NEEDED
Status: DISCONTINUED | OUTPATIENT
Start: 2024-02-27 | End: 2024-02-27 | Stop reason: SURG

## 2024-02-27 RX ORDER — KETOROLAC TROMETHAMINE 15 MG/ML
15 INJECTION, SOLUTION INTRAMUSCULAR; INTRAVENOUS
Qty: 7 ML | Refills: 0 | Status: DISCONTINUED | OUTPATIENT
Start: 2024-02-27 | End: 2024-02-28 | Stop reason: HOSPADM

## 2024-02-27 RX ORDER — ACETAMINOPHEN 325 MG/1
975 TABLET ORAL
Status: COMPLETED | OUTPATIENT
Start: 2024-02-27 | End: 2024-02-27

## 2024-02-27 RX ORDER — ONDANSETRON 4 MG/1
4 TABLET, ORALLY DISINTEGRATING ORAL EVERY 8 HOURS PRN
Status: DISCONTINUED | OUTPATIENT
Start: 2024-02-27 | End: 2024-02-28 | Stop reason: HOSPADM

## 2024-02-27 RX ORDER — ALUMINUM HYDROXIDE, MAGNESIUM HYDROXIDE, AND SIMETHICONE 1200; 120; 1200 MG/30ML; MG/30ML; MG/30ML
30 SUSPENSION ORAL EVERY 4 HOURS PRN
Status: DISCONTINUED | OUTPATIENT
Start: 2024-02-27 | End: 2024-02-28 | Stop reason: HOSPADM

## 2024-02-27 RX ORDER — DEXTROSE 50 % IN WATER (D50W) INTRAVENOUS SYRINGE
25 AS NEEDED
Status: DISCONTINUED | OUTPATIENT
Start: 2024-02-27 | End: 2024-02-27 | Stop reason: HOSPADM

## 2024-02-27 RX ORDER — DIPHENHYDRAMINE HCL 25 MG
25 CAPSULE ORAL EVERY 6 HOURS PRN
Status: DISCONTINUED | OUTPATIENT
Start: 2024-02-27 | End: 2024-02-28 | Stop reason: HOSPADM

## 2024-02-27 RX ORDER — ROPIVACAINE HYDROCHLORIDE 5 MG/ML
INJECTION, SOLUTION EPIDURAL; INFILTRATION; PERINEURAL
Status: DISCONTINUED | OUTPATIENT
Start: 2024-02-27 | End: 2024-02-27 | Stop reason: HOSPADM

## 2024-02-27 RX ORDER — CEPHALEXIN 500 MG/1
500 CAPSULE ORAL 3 TIMES DAILY
Status: DISCONTINUED | OUTPATIENT
Start: 2024-02-28 | End: 2024-02-28 | Stop reason: HOSPADM

## 2024-02-27 RX ORDER — CARBOXYMETHYLCELLULOSE SODIUM 5 MG/ML
1 SOLUTION/ DROPS OPHTHALMIC AS NEEDED
Status: DISCONTINUED | OUTPATIENT
Start: 2024-02-27 | End: 2024-02-28 | Stop reason: HOSPADM

## 2024-02-27 RX ORDER — NAPROXEN SODIUM 220 MG/1
81 TABLET, FILM COATED ORAL 2 TIMES DAILY
Status: DISCONTINUED | OUTPATIENT
Start: 2024-02-27 | End: 2024-02-28 | Stop reason: HOSPADM

## 2024-02-27 RX ORDER — ROSUVASTATIN CALCIUM 5 MG/1
5 TABLET, COATED ORAL EVERY EVENING
Status: DISCONTINUED | OUTPATIENT
Start: 2024-02-27 | End: 2024-02-28 | Stop reason: HOSPADM

## 2024-02-27 RX ORDER — CELECOXIB 200 MG/1
400 CAPSULE ORAL
Status: COMPLETED | OUTPATIENT
Start: 2024-02-27 | End: 2024-02-27

## 2024-02-27 RX ORDER — CELECOXIB 200 MG/1
CAPSULE ORAL
Status: COMPLETED
Start: 2024-02-27 | End: 2024-02-27

## 2024-02-27 RX ORDER — OXYCODONE HYDROCHLORIDE 5 MG/1
5-10 TABLET ORAL EVERY 4 HOURS PRN
Status: DISCONTINUED | OUTPATIENT
Start: 2024-02-27 | End: 2024-02-28 | Stop reason: HOSPADM

## 2024-02-27 RX ORDER — MIDAZOLAM HYDROCHLORIDE 2 MG/2ML
INJECTION, SOLUTION INTRAMUSCULAR; INTRAVENOUS AS NEEDED
Status: DISCONTINUED | OUTPATIENT
Start: 2024-02-27 | End: 2024-02-27 | Stop reason: SURG

## 2024-02-27 RX ORDER — IBUPROFEN 200 MG
16-32 TABLET ORAL AS NEEDED
Status: DISCONTINUED | OUTPATIENT
Start: 2024-02-27 | End: 2024-02-27 | Stop reason: HOSPADM

## 2024-02-27 RX ORDER — ACETAMINOPHEN 325 MG/1
650 TABLET ORAL
Status: DISCONTINUED | OUTPATIENT
Start: 2024-02-27 | End: 2024-02-28 | Stop reason: HOSPADM

## 2024-02-27 RX ORDER — ACETAMINOPHEN 325 MG/1
TABLET ORAL
Status: COMPLETED
Start: 2024-02-27 | End: 2024-02-27

## 2024-02-27 RX ORDER — VANCOMYCIN HYDROCHLORIDE
1250
Status: COMPLETED | OUTPATIENT
Start: 2024-02-27 | End: 2024-02-27

## 2024-02-27 RX ORDER — DIPHENHYDRAMINE HCL 50 MG/ML
25 VIAL (ML) INJECTION EVERY 6 HOURS PRN
Status: DISCONTINUED | OUTPATIENT
Start: 2024-02-27 | End: 2024-02-28 | Stop reason: HOSPADM

## 2024-02-27 RX ORDER — AMOXICILLIN 250 MG
1 CAPSULE ORAL 2 TIMES DAILY
Status: DISCONTINUED | OUTPATIENT
Start: 2024-02-27 | End: 2024-02-28 | Stop reason: HOSPADM

## 2024-02-27 RX ADMIN — SODIUM CHLORIDE: 9 INJECTION, SOLUTION INTRAVENOUS at 12:06

## 2024-02-27 RX ADMIN — FENTANYL CITRATE 25 MCG: 50 INJECTION INTRAMUSCULAR; INTRAVENOUS at 13:36

## 2024-02-27 RX ADMIN — ROSUVASTATIN CALCIUM 5 MG: 5 TABLET, FILM COATED ORAL at 21:54

## 2024-02-27 RX ADMIN — FENTANYL CITRATE 25 MCG: 50 INJECTION INTRAMUSCULAR; INTRAVENOUS at 13:55

## 2024-02-27 RX ADMIN — KETOROLAC TROMETHAMINE 15 MG: 15 INJECTION, SOLUTION INTRAMUSCULAR; INTRAVENOUS at 16:34

## 2024-02-27 RX ADMIN — CELECOXIB 400 MG: 200 CAPSULE ORAL at 10:31

## 2024-02-27 RX ADMIN — LIDOCAINE HYDROCHLORIDE 3 ML: 10 INJECTION, SOLUTION INFILTRATION; PERINEURAL at 12:01

## 2024-02-27 RX ADMIN — ACETAMINOPHEN 650 MG: 325 TABLET ORAL at 17:50

## 2024-02-27 RX ADMIN — HYDROMORPHONE HYDROCHLORIDE 0.5 MG: 1 INJECTION, SOLUTION INTRAMUSCULAR; INTRAVENOUS; SUBCUTANEOUS at 15:34

## 2024-02-27 RX ADMIN — ACETAMINOPHEN 975 MG: 325 TABLET ORAL at 10:30

## 2024-02-27 RX ADMIN — VANCOMYCIN HYDROCHLORIDE 1250 MG: 500 INJECTION, POWDER, LYOPHILIZED, FOR SOLUTION INTRAVENOUS at 10:13

## 2024-02-27 RX ADMIN — KETOROLAC TROMETHAMINE 15 MG: 15 INJECTION, SOLUTION INTRAMUSCULAR; INTRAVENOUS at 21:54

## 2024-02-27 RX ADMIN — ACETAMINOPHEN 650 MG: 325 TABLET ORAL at 23:30

## 2024-02-27 RX ADMIN — PROPOFOL 50 MCG/KG/MIN: 10 INJECTION, EMULSION INTRAVENOUS at 12:07

## 2024-02-27 RX ADMIN — SODIUM CHLORIDE: 9 INJECTION, SOLUTION INTRAVENOUS at 10:12

## 2024-02-27 RX ADMIN — ONDANSETRON 4 MG: 2 INJECTION INTRAMUSCULAR; INTRAVENOUS at 14:05

## 2024-02-27 RX ADMIN — TRANEXAMIC ACID 1000 MG: 10 INJECTION, SOLUTION INTRAVENOUS at 13:01

## 2024-02-27 RX ADMIN — FENTANYL CITRATE 25 MCG: 50 INJECTION INTRAMUSCULAR; INTRAVENOUS at 13:48

## 2024-02-27 RX ADMIN — FENTANYL CITRATE 25 MCG: 50 INJECTION INTRAMUSCULAR; INTRAVENOUS at 13:01

## 2024-02-27 RX ADMIN — CEFAZOLIN 2 G: 2 INJECTION, POWDER, FOR SOLUTION INTRAMUSCULAR; INTRAVENOUS at 12:11

## 2024-02-27 RX ADMIN — FENTANYL CITRATE 50 MCG: 50 INJECTION, SOLUTION INTRAMUSCULAR; INTRAVENOUS at 15:16

## 2024-02-27 RX ADMIN — HYDROMORPHONE HYDROCHLORIDE 0.5 MG: 1 INJECTION, SOLUTION INTRAMUSCULAR; INTRAVENOUS; SUBCUTANEOUS at 16:12

## 2024-02-27 RX ADMIN — BUPIVACAINE HYDROCHLORIDE IN DEXTROSE 2 ML: 7.5 INJECTION, SOLUTION SUBARACHNOID at 12:05

## 2024-02-27 RX ADMIN — FENTANYL CITRATE 50 MCG: 50 INJECTION, SOLUTION INTRAMUSCULAR; INTRAVENOUS at 15:08

## 2024-02-27 RX ADMIN — OXYCODONE HYDROCHLORIDE 5 MG: 5 TABLET ORAL at 23:30

## 2024-02-27 RX ADMIN — ASPIRIN 81 MG CHEWABLE TABLET 81 MG: 81 TABLET CHEWABLE at 19:52

## 2024-02-27 RX ADMIN — SODIUM CHLORIDE: 9 INJECTION, SOLUTION INTRAVENOUS at 17:51

## 2024-02-27 RX ADMIN — POLYETHYLENE GLYCOL 3350 17 G: 17 POWDER, FOR SOLUTION ORAL at 17:51

## 2024-02-27 RX ADMIN — MIDAZOLAM HYDROCHLORIDE 2 MG: 1 INJECTION, SOLUTION INTRAMUSCULAR; INTRAVENOUS at 11:56

## 2024-02-27 RX ADMIN — OXYCODONE HYDROCHLORIDE 5 MG: 5 TABLET ORAL at 17:50

## 2024-02-27 RX ADMIN — CEFAZOLIN 2 G: 2 INJECTION, POWDER, FOR SOLUTION INTRAMUSCULAR; INTRAVENOUS at 19:50

## 2024-02-27 RX ADMIN — SODIUM CHLORIDE, SODIUM GLUCONATE, SODIUM ACETATE, POTASSIUM CHLORIDE AND MAGNESIUM CHLORIDE: 526; 502; 368; 37; 30 INJECTION, SOLUTION INTRAVENOUS at 14:05

## 2024-02-27 RX ADMIN — DOCUSATE SODIUM AND SENNOSIDES 1 TABLET: 8.6; 5 TABLET, FILM COATED ORAL at 19:52

## 2024-02-27 RX ADMIN — Medication 50 MG/HR: at 12:09

## 2024-02-27 RX ADMIN — FENTANYL CITRATE 50 MCG: 50 INJECTION INTRAMUSCULAR; INTRAVENOUS at 12:04

## 2024-02-27 ASSESSMENT — COGNITIVE AND FUNCTIONAL STATUS - GENERAL
STANDING UP FROM CHAIR USING ARMS: 3 - A LITTLE
MOVING TO AND FROM BED TO CHAIR: 3 - A LITTLE
CLIMB 3 TO 5 STEPS WITH RAILING: 2 - A LOT
MOVING TO AND FROM BED TO CHAIR: 3 - A LITTLE
WALKING IN HOSPITAL ROOM: 3 - A LITTLE
WALKING IN HOSPITAL ROOM: 2 - A LOT
STANDING UP FROM CHAIR USING ARMS: 3 - A LITTLE
CLIMB 3 TO 5 STEPS WITH RAILING: 3 - A LITTLE

## 2024-02-27 ASSESSMENT — PAIN SCALES - GENERAL: PAIN_LEVEL: 3

## 2024-02-27 NOTE — PERIOPERATIVE NURSING NOTE
Pt unable to answer what pain level is tolerable for her. Multiple tries to explain, but pt states that the pain is tolerable. Will contunue to assess.

## 2024-02-27 NOTE — OR SURGEON
Pre-Procedure patient identification:  I am the primary operating surgeon/proceduralist and I have reviewed the applicable pathology reports and radiology studies for this procedure. I have identified the patient and confirmed laterality is left on 02/27/24 at 10:40 AM Aspen Woody MD  Phone Number: [phone]

## 2024-02-27 NOTE — ANESTHESIA PROCEDURE NOTES
Spinal Block    Patient location during procedure: OR  Start time: 2/27/2024 12:01 PM  End time: 2/27/2024 12:05 PM  Staffing  Performed: anesthesiologist   Anesthesiologist: Glendy Flower MD  Performed by: Glendy Flower MD  Authorized by: Glendy Flower MD    Reason for block: at surgeon's request  Preanesthetic Checklist  Completed: patient identified, surgical consent, pre-op evaluation, timeout performed, IV checked, risks and benefits discussed, monitors and equipment checked and sterile field maintained during procedure  Spinal Block  Patient position: sitting  Prep: ChloraPrep and site prepped and draped  Patient monitoring: heart rate, cardiac monitor, continuous pulse ox and blood pressure  Approach: midline  Location: L3-4  Injection technique: single-shot  Needle  Needle type: Sprotte   Needle gauge: 24 G  Needle length: 3.5 in  Assessment  Events: cerebrospinal fluid  Additional Notes  Procedure well tolerated. Vital signs stable.    Medications Administered -   bupivacaine PF (MARCAINE SPINAL) 0.75% intrathecal injection - intrathecal   2 mL - 2/27/2024 12:05:00 PM

## 2024-02-27 NOTE — ANESTHESIA POSTPROCEDURE EVALUATION
Patient: Bessy Newman    Procedure Summary     Date: 02/27/24 Room / Location: [facility] OR 01 / [facility] OR [facility]    Anesthesia Start: 1156 Anesthesia Stop: 1451    Procedure: Left Total Knee Arthroplasty (Left: Knee) Diagnosis:       Primary osteoarthritis of left knee      (Primary osteoarthritis of left knee [M17.12])    Surgeons: Aspen Woody MD Responsible Provider: Glendy Flower MD    Anesthesia Type: spinal ASA Status: 2          Anesthesia Type: spinal  PACU Vitals  2/27/2024 1446 - 2/27/2024 1546      2/27/2024  1454 2/27/2024  1500 2/27/2024  1508 2/27/2024  1515    BP: 96/50 127/61 127/61 121/58    Temp: 35.8 °C (96.5 °F) -- -- --    Pulse: 88 80 54 59    Resp: 13 20 15 16    SpO2: 98 % 100 % 98 % 100 %              2/27/2024  1516 2/27/2024  1530 2/27/2024  1534       BP: 121/58 116/59 116/59     Temp: -- -- --     Pulse: 59 75 75     Resp: 9 12 20     SpO2: 100 % 99 % 96 %             Anesthesia Post Evaluation    Pain score: 3  Pain management: adequate  Patient location during evaluation: PACU  Patient participation: complete - patient participated  Level of consciousness: awake and alert  Cardiovascular status: acceptable  Airway Patency: adequate  Respiratory status: acceptable  Hydration status: acceptable  Anesthetic complications: no

## 2024-02-27 NOTE — ASSESSMENT & PLAN NOTE
s/p Left Total Knee Arthroplasty by Dr Woody on 2/27/24   Continue PT/OT  Encourage IS  Continue Bowel regimen  DVT prophylaxis and pain meds per ortho

## 2024-02-27 NOTE — CONSULTS
"    Hospital Medicine Service -  Daily Progress Note       SUBJECTIVE   Interval History: Seen in PACU.  Having knee pain but says she is feeling better by the minute.  PACU nurse indicates she did give some analgesics.  The patient denies any chest pains or shortness of breath.  No nausea or vomiting.     OBJECTIVE      Vital signs in last 24 hours:  Temp:  [35.8 °C (96.5 °F)-36.4 °C (97.6 °F)] 35.8 °C (96.5 °F)  Heart Rate:  [54-88] 70  Resp:  [9-20] 17  BP: ()/(50-66) 131/61    Intake/Output Summary (Last 24 hours) at 2/27/2024 1624  Last data filed at 2/27/2024 1443  Gross per 24 hour   Intake 1250 ml   Output --   Net 1250 ml       PHYSICAL EXAMINATION      Physical Exam    General Appearance:        Awake, Alert, hard of hearing (doesn't have hearing aids in)   Head:    Normocephalic   Eyes:    No icterus   Respiratory:     Clear to auscultation bilaterally   Cardiovascular:    Regular rate and rhythm   GI:     Soft, non-tender, bowel sounds active    Psychiatric   cooperative      LINES, CATHETERS, DRAINS, AIRWAYS, AND WOUNDS   Lines, Drains, and Airways:  Wounds (agree with documentation and present on admission):  Peripheral IV (Adult) 02/27/24 Posterior;Right Wrist (Active)   Number of days: 0       Surgical Incision Knee Left (Active)   Number of days: 0         Comments:      LABS / IMAGING / TELE      Labs  Creatinine 1.1 preop  ASSESSMENT AND PLAN      * S/P left knee surgery  Assessment & Plan  s/p Left Total Knee Arthroplasty by Dr Woody on 2/27/24   - Management as per Ortho  - Pain control per Ortho  - DVT ppx per Ortho  - PT/OT  - Encourage IS  - Bowel regimen    Hypercalcemia  Assessment & Plan  H/o hypercalcemia; mild and chronic  - Monitor labs post-op    Abnormal EKG  Assessment & Plan  Noted on PAT eval  Cardiology consult was appreciated, \"Her EKG shows normal sinus rhythm with 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  There are no prior EKGs available for comparison.  The EKG is not " "necessarily pathologic and she has no concerning cardiac symptoms.  Therefore she is acceptable cardiac risk to proceed with her upcoming surgery.  We did discuss that at some point she may wish to obtain an echocardiogram but it does not necessary to have it performed prior to her upcoming surgery\"  - Outpatient follow-up w/ PCP/Cardiology w/ outpatient echocardiogram    Primary hypertension  Assessment & Plan  - Resume ACEi w/ hold parameters post-op as long as renal function is stable and as BP allows    Hyperlipidemia  Assessment & Plan  - Continue Crestor    Depression  Assessment & Plan  - Continue Effexor              Ayaz Ivey, DO  2/27/2024               "

## 2024-02-27 NOTE — ANESTHESIOLOGIST PRE-PROCEDURE ATTESTATION
Pre-Procedure Patient Identification:  I am the Primary Anesthesiologist and have identified the patient on 02/27/24 at 11:25 AM.   I have confirmed the procedure(s) will be performed by the following surgeon/proceduralist Aspen Woody MD.

## 2024-02-27 NOTE — PROGRESS NOTES
Orthopedic Post Surgical Note    78 year old female s/p Left TKA.     Physical Exam:  - dressing clean, dry, intact  - sensation intact to light touch  - palpable DP, PT pulses  - active dorsiflexion, plantarflexion, extensor hallucis longus    A/P:   - pain control  - physical therapy/occupational therapy  - DVT prophylaxis> asa 81 mg BID   - [facility] consult for medical management

## 2024-02-27 NOTE — OP NOTE
Operative Report    Patient:  Bessy Newman  :  [id number]  MRN:  [id number]  Date of Procedure:  24      Pre-operative Diagnosis:  Left Knee Degenerative Joint Disease    Post-operative Diagnosis:  Left Knee Degenerative Joint Disease    Operation: Left Total Knee Replacement    Surgeon:  Aspen Woody MD    Assistant(s):  ANGIE Vera    Anesthesia:  Spinal    Estimated Blood Loss:  25 ml    Specimens:  None    Drains:  None    Disposition: aroused from sedation, and taken to the recovery room in a stable condition      History of Present Illness:  Ms. Newman is a 78 y.o. year old female who has been followed in the out-patient clinic with a history of progressively worsening left knee pain secondary to degenerative arthritis.  After having failed conservative, non-surgical attempts at managing the pain and limitations of functional capabilities, it was felt that the only remaining option was surgical.  A detailed conversation regarding the risks and benefits of knee arthroplasty surgery was had with the patient.  The risks discussed included but were not limited to: bleeding (which may or may not require transfusion), infection, damage to nerves or blood vessels, deep venous thrombosis, pulmonary embolus, prosthetic failure, loosening, prosthetic fracture, femur or tibia fracture, dislocation, leg-length inequality, persistent pain, need for future surgery, medical complications (including cardiac, respiratory and neurologic complications), anaesthetic complications, and death.  Subsequent to this conversation, all of the patient's questions were answered in great detail and informed consent was obtained for a left total knee arthroplasty.  She received preoperative medical clearance and was felt optimized for surgery.     Description of Procedure:  After routine preparation and draping of the patient in the operating room, a clinical time-out was held confirming the correct patient name, MRN,  , planned procedure, site, antibiotic start time and agent, and outline of any surgical concerns.  All in attendance were in agreement to proceed.  A medial parapatellar approach was made to the left knee joint.  The distal femur was shaped to accept a 6 narrow femoral component.  The proximal tibia was shaped to accept a D tibial component with 12 millimeter MC type polyethylene insert.  The patella was noted to have no significant cartilage defects and was not resurfaced.  All cuts were checked using cutting and alignment guides.  The knee was felt to be well-aligned and stable throughout the range of motion after insertion of the trial and final knee components.  The bony surfaces were cleaned for cementation with pulsatile lavage.  All components were inserted using pressurized vacuum-mixed cement.  The wound was closed in layers with the skin closed using absorbable suture and a dressing was applied to the wound.    Jasmeet components were utilized for this procedure.    Implant Name Type Inv. Item Serial No.  Lot No. LRB No. Used Action   BONE CEMENT R 1X40 - [id number]  BONE CEMENT R 1X40  [employer] [id number] Left 1 Implanted   PIN TROCAR DRILL HEADLESS 3.2X75 MM - [id number]  PIN TROCAR DRILL HEADLESS 3.2X75 MM  [employer] [id number] Left 1 Implanted and Explanted   PIN TROCAR DRILL HEADLESS 3.2X75 MM - [id number]  PIN TROCAR DRILL HEADLESS 3.2X75 MM  [employer] [id number] Left 1 Implanted and Explanted   SCREW FEMALE PERSONA 2.5MM X 25MM - [id number] Bone screw SCREW FEMALE PERSONA 2.5MM X 25MM  [employer] [id number] Left 1 Implanted and Explanted   BONE CEMENT R 1X40 - [id number]  BONE CEMENT R 1X40  [employer] [id number] Left 1 Implanted   COMPONENT TIBIAL STEM PERSONA - [id number]  COMPONENT TIBIAL STEM PERSONA  [employer] [id number] Left 1 Implanted   FEMUR CRUCIATE RETAINING CEMENTED SZ 6 LEFT - [id number] Femoral knee component FEMUR CRUCIATE RETAINING CEMENTED SZ 6 LEFT  [employer] [id number]  Left 1 Implanted   PSN MC VEASF L 12MM 6-7CD - [id number]  PSN MC VEASF L 12MM 6-7CD  [employer] [id number] Left 1 Implanted       I was present and scrubbed throughout all the critical components of the operation.    Aspen Woody MD

## 2024-02-27 NOTE — ASSESSMENT & PLAN NOTE
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  There are no prior EKGs available for comparison.  The EKG is not necessarily pathologic and she has no concerning cardiac symptoms.  Therefore she is acceptable cardiac risk to proceed with her upcoming surgery.  We did discuss that at some point she may wish to obtain an echocardiogram but it does not necessary to have it performed prior to her upcoming surgery\"  - Outpatient follow-up w/ PCP/Cardiology w/ outpatient echocardiogram  "

## 2024-02-28 VITALS
HEIGHT: 66 IN | SYSTOLIC BLOOD PRESSURE: 137 MMHG | DIASTOLIC BLOOD PRESSURE: 62 MMHG | HEART RATE: 78 BPM | TEMPERATURE: 98 F | RESPIRATION RATE: 16 BRPM | WEIGHT: 175 LBS | BODY MASS INDEX: 28.12 KG/M2 | OXYGEN SATURATION: 94 %

## 2024-02-28 PROBLEM — K21.9 GERD (GASTROESOPHAGEAL REFLUX DISEASE): Status: ACTIVE | Noted: 2024-02-28

## 2024-02-28 LAB
ANION GAP SERPL CALC-SCNC: 7 MEQ/L (ref 3–15)
BUN SERPL-MCNC: 15 MG/DL (ref 7–25)
CALCIUM SERPL-MCNC: 8.7 MG/DL (ref 8.6–10.3)
CHLORIDE SERPL-SCNC: 105 MEQ/L (ref 98–107)
CO2 SERPL-SCNC: 25 MEQ/L (ref 21–31)
CREAT SERPL-MCNC: 0.6 MG/DL (ref 0.6–1.2)
EGFRCR SERPLBLD CKD-EPI 2021: >60 ML/MIN/1.73M*2
ERYTHROCYTE [DISTWIDTH] IN BLOOD BY AUTOMATED COUNT: 13.2 % (ref 11.7–14.4)
GLUCOSE SERPL-MCNC: 114 MG/DL (ref 70–99)
HCT VFR BLD AUTO: 38.2 % (ref 35–45)
HGB BLD-MCNC: 12.5 G/DL (ref 11.8–15.7)
MCH RBC QN AUTO: 31 PG (ref 28–33.2)
MCHC RBC AUTO-ENTMCNC: 32.7 G/DL (ref 32.2–35.5)
MCV RBC AUTO: 94.8 FL (ref 83–98)
PDW BLD AUTO: 9.8 FL (ref 9.4–12.3)
PLATELET # BLD AUTO: 226 K/UL (ref 150–369)
POTASSIUM SERPL-SCNC: 4.2 MEQ/L (ref 3.5–5.1)
RBC # BLD AUTO: 4.03 M/UL (ref 3.93–5.22)
SODIUM SERPL-SCNC: 137 MEQ/L (ref 136–145)
WBC # BLD AUTO: 8.06 K/UL (ref 3.8–10.5)

## 2024-02-28 PROCEDURE — 36415 COLL VENOUS BLD VENIPUNCTURE: CPT | Performed by: NURSE PRACTITIONER

## 2024-02-28 PROCEDURE — 25800000 HC PHARMACY IV SOLUTIONS: Performed by: NURSE PRACTITIONER

## 2024-02-28 PROCEDURE — 97116 GAIT TRAINING THERAPY: CPT | Mod: GP,CQ,59

## 2024-02-28 PROCEDURE — 97162 PT EVAL MOD COMPLEX 30 MIN: CPT | Mod: GP

## 2024-02-28 PROCEDURE — 99232 SBSQ HOSP IP/OBS MODERATE 35: CPT | Performed by: HOSPITALIST

## 2024-02-28 PROCEDURE — 97166 OT EVAL MOD COMPLEX 45 MIN: CPT | Mod: GO

## 2024-02-28 PROCEDURE — 85027 COMPLETE CBC AUTOMATED: CPT | Performed by: NURSE PRACTITIONER

## 2024-02-28 PROCEDURE — 80048 BASIC METABOLIC PNL TOTAL CA: CPT | Performed by: NURSE PRACTITIONER

## 2024-02-28 PROCEDURE — 63700000 HC SELF-ADMINISTRABLE DRUG: Performed by: NURSE PRACTITIONER

## 2024-02-28 PROCEDURE — 84520 ASSAY OF UREA NITROGEN: CPT | Performed by: NURSE PRACTITIONER

## 2024-02-28 PROCEDURE — 63600000 HC DRUGS/DETAIL CODE: Mod: JZ | Performed by: NURSE PRACTITIONER

## 2024-02-28 PROCEDURE — 97150 GROUP THERAPEUTIC PROCEDURES: CPT | Mod: GO,CO

## 2024-02-28 PROCEDURE — 97535 SELF CARE MNGMENT TRAINING: CPT | Mod: GO,59

## 2024-02-28 PROCEDURE — 97150 GROUP THERAPEUTIC PROCEDURES: CPT | Mod: GP,CQ

## 2024-02-28 RX ORDER — NALOXONE HYDROCHLORIDE 4 MG/.1ML
1 SPRAY NASAL ONCE AS NEEDED
Qty: 1 EACH | Refills: 0 | Status: SHIPPED | OUTPATIENT
Start: 2024-02-28

## 2024-02-28 RX ORDER — NAPROXEN SODIUM 220 MG/1
81 TABLET, FILM COATED ORAL 2 TIMES DAILY
Start: 2024-02-28 | End: 2024-03-27

## 2024-02-28 RX ORDER — AMOXICILLIN 250 MG
1 CAPSULE ORAL 2 TIMES DAILY
Start: 2024-02-28

## 2024-02-28 RX ORDER — OMEPRAZOLE 40 MG/1
40 CAPSULE, DELAYED RELEASE ORAL
Qty: 30 CAPSULE | Refills: 0 | Status: SHIPPED | OUTPATIENT
Start: 2024-02-28 | End: 2024-03-29

## 2024-02-28 RX ORDER — ACETAMINOPHEN 325 MG/1
650 TABLET ORAL EVERY 6 HOURS PRN
Start: 2024-02-28 | End: 2024-03-29

## 2024-02-28 RX ORDER — CEPHALEXIN 500 MG/1
500 CAPSULE ORAL 3 TIMES DAILY
Qty: 42 CAPSULE | Refills: 0 | Status: SHIPPED | OUTPATIENT
Start: 2024-02-28 | End: 2024-03-13

## 2024-02-28 RX ORDER — POLYETHYLENE GLYCOL 3350 17 G/17G
17 POWDER, FOR SOLUTION ORAL DAILY PRN
Start: 2024-02-28 | End: 2024-03-29

## 2024-02-28 RX ORDER — OXYCODONE HYDROCHLORIDE 5 MG/1
5-10 TABLET ORAL EVERY 4 HOURS PRN
Qty: 30 TABLET | Refills: 0 | Status: SHIPPED | OUTPATIENT
Start: 2024-02-28 | End: 2024-03-04

## 2024-02-28 RX ADMIN — ASPIRIN 81 MG CHEWABLE TABLET 81 MG: 81 TABLET CHEWABLE at 09:14

## 2024-02-28 RX ADMIN — DEXAMETHASONE SODIUM PHOSPHATE 8 MG: 4 INJECTION, SOLUTION INTRA-ARTICULAR; INTRALESIONAL; INTRAMUSCULAR; INTRAVENOUS; SOFT TISSUE at 06:00

## 2024-02-28 RX ADMIN — ACETAMINOPHEN 650 MG: 325 TABLET ORAL at 13:50

## 2024-02-28 RX ADMIN — ACETAMINOPHEN 650 MG: 325 TABLET ORAL at 06:00

## 2024-02-28 RX ADMIN — OXYCODONE HYDROCHLORIDE 10 MG: 5 TABLET ORAL at 03:57

## 2024-02-28 RX ADMIN — DOCUSATE SODIUM AND SENNOSIDES 1 TABLET: 8.6; 5 TABLET, FILM COATED ORAL at 09:14

## 2024-02-28 RX ADMIN — KETOROLAC TROMETHAMINE 15 MG: 15 INJECTION, SOLUTION INTRAMUSCULAR; INTRAVENOUS at 03:57

## 2024-02-28 RX ADMIN — CEPHALEXIN 500 MG: 500 CAPSULE ORAL at 13:50

## 2024-02-28 RX ADMIN — FLUTICASONE PROPIONATE 1 SPRAY: 50 SPRAY, METERED NASAL at 11:09

## 2024-02-28 RX ADMIN — SODIUM CHLORIDE: 9 INJECTION, SOLUTION INTRAVENOUS at 07:30

## 2024-02-28 RX ADMIN — OXYCODONE HYDROCHLORIDE 10 MG: 5 TABLET ORAL at 09:15

## 2024-02-28 RX ADMIN — KETOROLAC TROMETHAMINE 15 MG: 15 INJECTION, SOLUTION INTRAMUSCULAR; INTRAVENOUS at 09:15

## 2024-02-28 RX ADMIN — CEFAZOLIN 2 G: 2 INJECTION, POWDER, FOR SOLUTION INTRAMUSCULAR; INTRAVENOUS at 03:59

## 2024-02-28 RX ADMIN — VENLAFAXINE HYDROCHLORIDE 225 MG: 150 CAPSULE, EXTENDED RELEASE ORAL at 09:14

## 2024-02-28 RX ADMIN — OXYCODONE HYDROCHLORIDE 10 MG: 5 TABLET ORAL at 14:20

## 2024-02-28 ASSESSMENT — COGNITIVE AND FUNCTIONAL STATUS - GENERAL
DRESSING REGULAR UPPER BODY CLOTHING: 4 - NONE
STANDING UP FROM CHAIR USING ARMS: 3 - A LITTLE
DRESSING REGULAR LOWER BODY CLOTHING: 3 - A LITTLE
CLIMB 3 TO 5 STEPS WITH RAILING: 4 - NONE
HELP NEEDED FOR PERSONAL GROOMING: 4 - NONE
HELP NEEDED FOR BATHING: 3 - A LITTLE
DRESSING REGULAR UPPER BODY CLOTHING: 4 - NONE
EATING MEALS: 4 - NONE
DRESSING REGULAR LOWER BODY CLOTHING: 3 - A LITTLE
WALKING IN HOSPITAL ROOM: 3 - A LITTLE
HELP NEEDED FOR BATHING: 3 - A LITTLE
STANDING UP FROM CHAIR USING ARMS: 4 - NONE
TOILETING: 3 - A LITTLE
MOVING TO AND FROM BED TO CHAIR: 3 - A LITTLE
AFFECT: WFL
HELP NEEDED FOR PERSONAL GROOMING: 3 - A LITTLE
CLIMB 3 TO 5 STEPS WITH RAILING: 3 - A LITTLE
CLIMB 3 TO 5 STEPS WITH RAILING: 3 - A LITTLE
TOILETING: 3 - A LITTLE
WALKING IN HOSPITAL ROOM: 4 - NONE
WALKING IN HOSPITAL ROOM: 3 - A LITTLE
EATING MEALS: 4 - NONE
AFFECT: WFL
AFFECT: WFL
MOVING TO AND FROM BED TO CHAIR: 3 - A LITTLE
MOVING TO AND FROM BED TO CHAIR: 4 - NONE
STANDING UP FROM CHAIR USING ARMS: 3 - A LITTLE

## 2024-02-28 NOTE — PLAN OF CARE
Plan of Care Review  Plan of Care Reviewed With: patient  Progress: improving  Outcome Evaluation: pt oob ax1 w/ RW to bsthroom. Pain controlled w/ current meds. Elevated PVRs, ortho aware. The plan is to continue to monitor.

## 2024-02-28 NOTE — PLAN OF CARE
Problem: Knee Arthroplasty  Goal: Optimal Functional Ability  Outcome: Progressing     Problem: Adult Inpatient Plan of Care  Goal: Plan of Care Review  Flowsheets (Taken 2/28/2024 1205)  Progress: improving  Outcome Evaluation: OT eval complete  Plan of Care Reviewed With: patient

## 2024-02-28 NOTE — ASSESSMENT & PLAN NOTE
The patient has heartburn symptoms from time to time.  She also describes very occasional trouble swallowing/food getting stuck sensation.  She has not seen any provider for this recently.  I suggested follow-up with her PCP.  I also suggested empiric treatment with a PPI for about 3 months and then GI evaluation if no improvement.  I told her some names of over-the-counter PPIs that she could obtain.  She could also see a gastroenterologist sooner if she desires.  Again, I also suggested that she discuss further with her PCP.

## 2024-02-28 NOTE — PROGRESS NOTES
Physical Therapy -  Initial Evaluation     Patient: Bessy Newman  Location: [facility] 5PAV 5422  MRN: [id number]  Today's date: 2/28/2024    HISTORY OF PRESENT ILLNESS     Bessy is a 78 y.o. female admitted on 2/27/2024 with Primary osteoarthritis of left knee [M17.12]  S/P left knee surgery [Z98.890]. Principal problem is S/P left knee surgery.    Past Medical History  Bessy has a past medical history of Arthritis, Bronchitis, Depression, Dry eye, Hypertension, Kidney stone, Lipid disorder, Migraine headache, Osteopenia, and Seasonal allergies.    History of Present Illness   S/P L TKA on 2/27/24    PRIOR LEVEL OF FUNCTION AND LIVING ENVIRONMENT     Prior Level of Function    Flowsheet Row Most Recent Value   Dominant Hand right   Ambulation independent   Transferring independent   Toileting independent   Bathing independent   Dressing independent   Eating independent   IADLs independent   Driving/Transportation    Communication understands/communicates without difficulty   Prior Level of Function Comment Independent at baseline for ADLs and IADLs   Assistive Device Currently Used at Home none        Prior Living Environment    Flowsheet Row Most Recent Value   People in Home child(sebastian), adult   Current Living Arrangements apartment   Living Environment Comment 1 floor apt, no steps, stall shower        VITALS AND PAIN     PT Vitals    Date/Time BP BP Method Pt Position [facility]   02/28/24 0833 108/59 Automatic Sitting LA      PT Pain    Date/Time Pain Type Side/Orientation Location Rating: Rest Rating: Activity Interventions [facility]   02/28/24 0833 Pain Assessment left knee 2 - mild pain 2 - mild pain position adjusted LA           Objective   OBJECTIVE     Start time:  0833  End time:  0843  Session Length: 10 min  Mode of Treatment: individual therapy, physical therapy    General Observations  Patient received in bathroom (with RN). She was no issues or concerns identified by nurse prior to  session, agreeable to therapy.      Precautions: fall, weight bearing  Extremity Weight-Bearing Status: left lower extremity  Left LE Weight-Bearing Status: weight-bearing as tolerated (WBAT)           PT Eval and Treat - 02/28/24 0833        Cognition    Orientation Status oriented x 4     Affect/Mental Status WFL     Follows Commands WFL     Cognitive Function WFL        Vision Assessment/Intervention    Vision Assessment WFL        Hearing Assessment    Hearing Status WFL        Sensory Assessment    Sensory Assessment sensation intact, lower extremities        Lower Extremity Range of Motion    Knee, Left (ROM) AROM 12-50        Lower Extremity Strength    Hip, Left (Strength) 4/5     Knee, Left (Strength) 3/5     Ankle, Left (Strength) 5/5     Hip, Right (Strength) 4/5     Knee, Right (Strength) 5/5     Ankle, Right (Strength) 5/5        Mobility Belt    Mobility Belt Used for All Out of Bed Activity no     Reason Mobility Belt Not Used other (see comments)     Reason Mobility Belt Not Used received OOB in bathroom with RN        Sit/Stand Transfer    Surface --   from toilet    [place] supervision     Assistive Device walker, front-wheeled     Transfer Comments supervision, no (A), no overt LOB        Gait Training    [place], Gait supervision     Assistive Device walker, front-wheeled     Distance in Feet 15 feet     Pattern step-to     Deviations/Abnormal Patterns weight shifting decreased;harjit decreased;step length decreased;stride length decreased;antalgic     Comment (Gait/Stairs) cues for step sequencing with RW, no overt LOB, denies dizziness        Stairs Training    [place], Stairs other (see comments)     Comment no steps to enter apt        Balance    Static Sitting Balance WFL     Sit to Stand Dynamic Balance mild impairment     Static Standing Balance mild impairment     Dynamic Standing Balance mild impairment     Comment, Balance supervision w/ RW        Lower Extremity  (Therapeutic Exercise)    Exercise Position/Type seated;AROM (active range of motion)     General Exercise bilateral;ankle pumps;LAQ (long arc quad)     Comment Educated on the role of exercises to promote ROM and LE strength        Impairments/Safety Issues    Impairments Affecting Function pain;endurance/activity tolerance;range of motion (ROM)                                Education Documentation  Unresolved/Worsening Symptoms, taught by Janet Valverde PT at 2/28/2024 11:37 AM.  Learner: Patient  Readiness: Acceptance  Method: Explanation, Demonstration  Response: Verbalizes Understanding, Demonstrated Understanding  Comment: Reviewed with pt the role of PT in acute care, importance of mobility, safety with mobility, proper transfer technique, gait training using an AD, use of cryotherapy for pain and edema management    Joint Mobility/Strength, taught by Janet Valverde PT at 2/28/2024 11:37 AM.  Learner: Patient  Readiness: Acceptance  Method: Explanation, Demonstration  Response: Verbalizes Understanding, Demonstrated Understanding  Comment: Reviewed with pt the role of PT in acute care, importance of mobility, safety with mobility, proper transfer technique, gait training using an AD, use of cryotherapy for pain and edema management    Home Safety, taught by Janet Valverde PT at 2/28/2024 11:37 AM.  Learner: Patient  Readiness: Acceptance  Method: Explanation, Demonstration  Response: Verbalizes Understanding, Demonstrated Understanding  Comment: Reviewed with pt the role of PT in acute care, importance of mobility, safety with mobility, proper transfer technique, gait training using an AD, use of cryotherapy for pain and edema management    Energy Conservation, taught by Janet Valverde PT at 2/28/2024 11:37 AM.  Learner: Patient  Readiness: Acceptance  Method: Explanation, Demonstration  Response: Verbalizes Understanding, Demonstrated Understanding  Comment: Reviewed with pt the role of PT  in acute care, importance of mobility, safety with mobility, proper transfer technique, gait training using an AD, use of cryotherapy for pain and edema management    Assistive/Adaptive Devices, taught by Janet Valverde, PT at 2/28/2024 11:37 AM.  Learner: Patient  Readiness: Acceptance  Method: Explanation, Demonstration  Response: Verbalizes Understanding, Demonstrated Understanding  Comment: Reviewed with pt the role of PT in acute care, importance of mobility, safety with mobility, proper transfer technique, gait training using an AD, use of cryotherapy for pain and edema management    Signs/Symptoms, taught by Janet Valverde, PT at 2/28/2024 11:37 AM.  Learner: Patient  Readiness: Acceptance  Method: Explanation, Demonstration  Response: Verbalizes Understanding, Demonstrated Understanding  Comment: Reviewed with pt the role of PT in acute care, importance of mobility, safety with mobility, proper transfer technique, gait training using an AD, use of cryotherapy for pain and edema management    Risk Factors, taught by Janet Valverde, PT at 2/28/2024 11:37 AM.  Learner: Patient  Readiness: Acceptance  Method: Explanation, Demonstration  Response: Verbalizes Understanding, Demonstrated Understanding  Comment: Reviewed with pt the role of PT in acute care, importance of mobility, safety with mobility, proper transfer technique, gait training using an AD, use of cryotherapy for pain and edema management        Session Outcome  Patient upright, in chair at end of session, call light in reach, personal items in reach, all needs met, chair alarm on. Nursing notified about patient's performance, patient's position, and patient's response to therapy/activity.    AM-PAC™ - Mobility (Current Function)     Turning form your back to your side while in flat bed without using bedrails 4 - None   Moving from lying on your back to sitting on the side of a flat bed without using bedrails 4 - None   Moving to and from a  bed to a chair 3 - A Little   Standing up from a chair using your arms 3 - A Little   To walk in a hospital room 3 - A Little   Climbing 3-5 steps with a railing 3 - A Little   AM-PAC™ Mobility Score 20      ASSESSMENT AND PLAN     Progress Summary  2/28/24 Pt is a 78 year old female s/p L TKA post op day one. pt seen for PT session. The patient was educated on the role of PT in the acute care setting. The patient was instructed on proper technique when performing OOB transfers to ensure their safety. Gait training was performed using the necessary AD along with instructing the patient regarding the proper set up and use of the device. All questions and concerns addressed and answered during session. Pt tolerated session well  The pt can complete functional tasks including performing transfers from low surface using RW without assist; ambulating well with supervision. Pt to attend ortho class and return home at [place]    Patient/Family Therapy Goals Statement: go home    PT Plan    Flowsheet Row Most Recent Value   Rehab Potential good, to achieve stated therapy goals at 02/28/2024 0833   Therapy Frequency daily at 02/28/2024 0833   Planned Therapy Interventions transfer training, ROM (range of motion), gait training, home exercise program, patient/family education at 02/28/2024 0833          PT Discharge Recommendations    Flowsheet Row Most Recent Value   PT Recommended Discharge Disposition home with assistance at 02/28/2024 0833   Anticipated Equipment Needs if Discharged Home (PT) walker, front-wheeled at 02/28/2024 0833               PT Goals    Flowsheet Row Most Recent Value   Transfer Goal 1    Activity/Assistive Device all transfers at 02/28/2024 0833   [place] modified independence at 02/28/2024 0833   Time Frame short-term goal (STG) at 02/28/2024 0833   Progress/Outcome goal ongoing at 02/28/2024 0833   Gait Training Goal 1    Activity/Assistive Device gait (walking locomotion) at 02/28/2024 0833    [place] modified independence at 02/28/2024 0833   Distance 100 at 02/28/2024 0833   Time Frame short-term goal (STG) at 02/28/2024 0833   Progress/Outcome goal ongoing at 02/28/2024 0833   ROM Goal 1    ROM Goal 1 AROM L knee flex ext 0-90 at 02/28/2024 0833   Time Frame short-term goal (STG) at 02/28/2024 0833   Progress/Outcome goal ongoing at 02/28/2024 0833

## 2024-02-28 NOTE — HOSPITAL COURSE
Bessy is a 78 y.o. female admitted on 2/27/2024 with Primary osteoarthritis of left knee [M17.12]  S/P left knee surgery [Z98.890]. Principal problem is S/P left knee surgery.    Past Medical History  Bessy has a past medical history of Arthritis, Bronchitis, Depression, Dry eye, Hypertension, Kidney stone, Lipid disorder, Migraine headache, Osteopenia, and Seasonal allergies.    History of Present Illness   S/P L TKA on 2/27/24

## 2024-02-28 NOTE — PLAN OF CARE
Plan of Care Review  Plan of Care Reviewed With: patient  Progress: improving  Outcome Evaluation: oxycodone, tylenol and toradol for pain.  tolerating PO intake.  DTV [id number].

## 2024-02-28 NOTE — PROGRESS NOTES
Occupational Therapy -  Daily Treatment/Progress Note     Patient: Bessy Newman  Location: [facility] 5PAV 5422  MRN: [id number]  Today's date: 2/28/2024    HISTORY OF PRESENT ILLNESS     Bessy is a 78 y.o. female admitted on 2/27/2024 with Primary osteoarthritis of left knee [M17.12]  S/P left knee surgery [Z98.890]. Principal problem is S/P left knee surgery.    Past Medical History  Bessy has a past medical history of Arthritis, Bronchitis, Depression, Dry eye, Hypertension, Kidney stone, Lipid disorder, Migraine headache, Osteopenia, and Seasonal allergies.    History of Present Illness   S/P L TKA on 2/27/24    PRIOR LEVEL OF FUNCTION AND LIVING ENVIRONMENT     Prior Level of Function    Flowsheet Row Most Recent Value   Dominant Hand right   Ambulation independent   Transferring independent   Toileting independent   Bathing independent   Dressing independent   Eating independent   IADLs independent   Driving/Transportation other (see comments)  [Pt reports that she does not drive]   Communication understands/communicates without difficulty   Prior Level of Function Comment Pt reports independence with ADLs/IADLs at baseline, no AD, -    Assistive Device Currently Used at Home cane, straight        Prior Living Environment    Flowsheet Row Most Recent Value   People in Home facility resident   Current Living Arrangements apartment   Living Environment Comment Pt lives in 1 story apt in independent living facility, no steps to enter, no steps inside, stall shower, curb outside to step up onto        Occupational Profile    Flowsheet Row Most Recent Value   Successful Occupations Pt enjoys quilting   Occupational History/Life Experiences Pt was a    Environmental Supports and Barriers Supportive daughter at bedside        VITALS AND PAIN     OT Vitals    Date/Time Pulse HR Source BP [facility]   02/28/24 1219 78 Monitor 137/62 MTC      OT Pain    Date/Time Side/Orientation Location Rating: Rest  Rating: Activity Interventions [facility]   02/28/24 1219 left knee 3 4 position adjusted MTC           Objective   OBJECTIVE     Start time:  1219  End time:  1301  Session Length: 42 min  Mode of Treatment: occupational therapy, group therapy    General Observations  Patient received  . She was agreeable to therapy, no issues or concerns identified by nurse prior to session.      Precautions: fall, weight bearing  Extremity Weight-Bearing Status: left lower extremity  Left LE Weight-Bearing Status: weight-bearing as tolerated (WBAT)           OT Eval and Treat - 02/28/24 1219        Cognition    Orientation Status oriented x 4     Follows Commands WNL        Bed Mobility    Bed Mobility Activities sit to supine;supine to sit     [place] independent     Comment (I) for bed mobility without use of bed features        Mobility Belt    Mobility Belt Used for All Out of Bed Activity no     Reason Mobility Belt Not Used other (see comments)     Reason Mobility Belt Not Used Mod I        Sit/Stand Transfer    Surface chair with arm rests;edge of bed     Transfer Comments Mod I for all transfers this session, utiliizing RW. Cues provided for safe preparatory hand placement, pt receptive.        Toilet Transfer    Transfer Technique sit/stand     [place] modified independence     Comment Pt provided both verbal education and visual demonstration of toilet transfer. Pt provided education on safe transfer techniques as well as AD/AE avaiable for increased safety. Mod I, pt reporting raised toilet seat/grab bars available upon return home.        Shower/Tub Transfer    Comment Pt provided both verbal education and visual demonstration of safe tub transfer. Pt education on use of tub bench/chair for increased safety during transfer. Educated on transfer techniques for each AD. Recommending pt have close assist for inital trasnfer to ensure safety. Daughter assist.        Functional Mobility    Distance in therapy gym      Functional Mobility [place] modified independence     Assistive Device walker, front-wheeled     Functional Mobility Comments Mod I for all mobility this session, utiliizing RW. No overt LOB noted this session.        Bathing    Comment Pt educated on techniques for increased safety with bathing. Demonstration and verbal education provided on AE/AD to increase energy conservation during task, pt receptive.        Lower Body Dressing    Comment Pt provided extensive education/demonstration of LB dressing AE to increased independance, energy conservation and safety. Pt educated on adaptive techniques such as Figure 4 Method as well as dressing affected limb first, for increased ease. Pt receptive to education provided, verbilizing unerstanding.        Toileting    Comment Pt provided education on AD's to increased independance and energy conservation. Pt provided education on 3-1 commode and raised toilet seats as needed for increased (I), pt receptive.        Balance    Static Sitting Balance WNL     Dynamic Sitting Balance WNL     Sit to Stand Dynamic Balance WNL     Static Standing Balance WNL     Dynamic Standing Balance WNL                 Functional Reach Test  Trial One: Functional Reach Test (in): 8 inches  Trial Two: Functional Reach Test (in): 7 inches  Average (in): 7.5 inches              Education Documentation  Activity, taught by Alfred Waters COTA at 2/28/2024  2:16 PM.  Learner: Patient  Readiness: Acceptance  Method: Explanation, Demonstration  Response: Verbalizes Understanding  Comment: Pt educated on: toilet/tub/shower/bed transfers, AD/AE for transfers/dressing and increased safety. Pt provided verbal education as well as visual demonstration of each trasnfer as well as all AE/AD's. Pt verbalizing understanding of education.        Session Outcome  Patient in chair, in therapy gym at end of session, returned to room by therapy aide. Nursing notified about patient's  performance.    AM-PAC™ - ADL (Current Function)     Putting on/taking off regular lower body clothing 3 - A Little   Bathing 3 - A Little   Toileting 3 - A Little   Putting on/taking off regular upper body clothing 4 - None   Help for taking care of personal grooming 3 - A Little   Eating meals 4 - None   AM-PAC™ ADL Score 20      ASSESSMENT AND PLAN     Progress Summary  Pt agreeable to therapy this date. Pt demonstrating good safety/balance this session with use of RW. Pt is functioning at an overall Mod (I) level with all transfers/mobility. Pt provided extensive verbal education as well as visual demonstration of AE available for transfer and bathing/dressing, pt receptive to education. Recommending pt return home with daughter assist at d/c.    Patient/Family Therapy Goal Statement: to go home safely    OT Plan    Flowsheet Row Most Recent Value   Rehab Potential good, to achieve stated therapy goals at 02/28/2024 1015   Therapy Frequency 5 times/wk at 02/28/2024 1015   Planned Therapy Interventions occupation/activity based interventions, transfer/mobility retraining, functional balance retraining, adaptive equipment training, BADL retraining at 02/28/2024 1015          OT Discharge Recommendations    Flowsheet Row Most Recent Value   OT Recommended Discharge Disposition home with assistance at 02/28/2024 1015   Anticipated Equipment Needs if Discharged Home (OT) dressing equipment, bathing equipment, reacher, walker, front-wheeled at 02/28/2024 1015               OT Goals    Flowsheet Row Most Recent Value   Bed Mobility Goal 1    Activity/Assistive Device bed mobility activities, all at 02/28/2024 1015   [place] modified independence at 02/28/2024 1015   Time Frame by discharge at 02/28/2024 1015   Progress/Outcome goal ongoing at 02/28/2024 1015   Transfer Goal 1    Activity/Assistive Device sit-to-stand/stand-to-sit, toilet at 02/28/2024 1015   [place] supervision required at 02/28/2024 1015    Time Frame by discharge at 02/28/2024 1015   Progress/Outcome goal ongoing at 02/28/2024 1015   Transfer Goal 2    Activity/Assistive Device shower at 02/28/2024 1015   [place] supervision required at 02/28/2024 1015   Time Frame by discharge at 02/28/2024 1015   Progress/Outcome goal ongoing at 02/28/2024 1015   Dressing Goal 1    Activity/Adaptive Equipment lower body dressing at 02/28/2024 1015   [place] modified independence at 02/28/2024 1015   Time Frame by discharge at 02/28/2024 1015   Progress/Outcome goal ongoing at 02/28/2024 1015

## 2024-02-28 NOTE — PLAN OF CARE
Care Coordination Admission Assessment Note    General Information:  Readmission Within the last 30 days: no previous admission in last 30 days  Does patient have a :    Patient-Specific Goals (include timeframe): home    Living Arrangements:  Arrived From: home  Current Living Arrangements: apartment  People in Home: facility resident  Home Accessibility:    Living Arrangement Comments: REINIER lives in first floor apartment alone daughter staying with pt for few days    Housing Stability and Utility Access (SDOH):  In the last 12 months, was there a time when you were not able to pay the mortgage or rent on time?: No  In the last 12 months, how many places have you lived?: 1  In the last 12 months, was there a time when you did not have a steady place to sleep or slept in a shelter (including now)?: No  In the past 12 months has the electric, gas, oil, or water company threatened to shut off services in your home?: No    Functional Status Prior to Admission:   Assistive Device/Animal Currently Used at Home: candavide gilbert  Functional Status Comments: cane for mobility  IADL Comments: independent with ADL     Supports and Services:  Current Outpatient/Agency/Support Group: none  Type of Current Home Care Services:    History of home care episode or rehab stay: no HX rehab or C    Discharge Needs Assessment:   Concerns to be Addressed: discharge planning, care coordination/care conferences  Current Discharge Risk:    Anticipated Changes Related to Illness: none    Patient/Family Anticipated Discharge Plan:  Patient/Family Anticipates Transition To: home with family  Patient/Family Anticipated Services at Transition: none    Connection to Community       Patient Choice:   Offered/Gave Vendor List: no  Patient's Choice of Community Agency(s):         Anticipated Discharge Plan:  Met with patient. Provided education and contact information for Care Coordination services.: yes  Anticipated Discharge  Disposition: home with assistance     Transportation Needs (SDOH):  Transportation Concerns: none  Transportation Anticipated: family or friend will provide  Is Out of Hospital DNR needed at discharge?: no    In the past 12 months, has lack of transportation kept you from medical appointments or from getting medications?: No  In the past 12 months, has lack of transportation kept you from meetings, work, or from getting things needed for daily living?: No    Concerns - comments:     DCP: Home without Services    CC following

## 2024-02-28 NOTE — PROGRESS NOTES
Hospital Medicine Service -  Daily Progress Note       SUBJECTIVE   Interval History: Says she is mostly doing okay.  Just had a very brief 3-minute episode of feeling like food was getting stuck when she ate breakfast this morning.  Says it happens from time to time.  Occasionally has acid reflux type symptoms.  Has not discussed with her PCP.  She plans to follow-up with her PCP to discuss.  She tends to avoid acidic foods and drinks and peppers, tomatoes, etc. as this makes her stomach upset.    No chest pains or shortness of breath.  No vomiting.  No abdominal pain.  Not lightheaded or dizzy.     OBJECTIVE      Vital signs in last 24 hours:  Temp:  [35.8 °C (96.5 °F)-36.7 °C (98 °F)] 36.7 °C (98 °F)  Heart Rate:  [54-91] 80  Resp:  [9-20] 16  BP: ()/(50-80) 103/53    Intake/Output Summary (Last 24 hours) at 2/28/2024 1126  Last data filed at 2/28/2024 0615  Gross per 24 hour   Intake 1250 ml   Output 800 ml   Net 450 ml       PHYSICAL EXAMINATION      Physical Exam    General Appearance:        Awake, Alert, Oriented x3   Head:    Normocephalic   Eyes:    No icterus   Respiratory:     Clear to auscultation bilaterally   Cardiovascular:    Regular rate and rhythm   GI:     Soft, non-tender, bowel sounds active    Psychiatric   Appropriate, cooperative      LINES, CATHETERS, DRAINS, AIRWAYS, AND WOUNDS   Lines, Drains, and Airways:  Wounds (agree with documentation and present on admission):  Peripheral IV (Adult) 02/27/24 Posterior;Right Wrist (Active)   Number of days: 1       Surgical Incision Knee Left (Active)   Number of days: 1         Comments:      LABS / IMAGING / TELE      Labs  Results from last 7 days   Lab Units 02/28/24  0353   WBC K/uL 8.06   HEMOGLOBIN g/dL 12.5   HEMATOCRIT % 38.2   PLATELETS K/uL 226     Results from last 7 days   Lab Units 02/28/24  0353   SODIUM mEQ/L 137   POTASSIUM mEQ/L 4.2   CHLORIDE mEQ/L 105   CO2 mEQ/L 25   BUN mg/dL 15   CREATININE mg/dL 0.6   GLUCOSE mg/dL  "114*   CALCIUM mg/dL 8.7       ASSESSMENT AND PLAN      * S/P left knee surgery  Assessment & Plan  s/p Left Total Knee Arthroplasty by Dr Woody on 2/27/24   Continue PT/OT  Encourage IS  Continue Bowel regimen  DVT prophylaxis and pain meds per ortho      GERD (gastroesophageal reflux disease)  Assessment & Plan  The patient has heartburn symptoms from time to time.  She also describes very occasional trouble swallowing/food getting stuck sensation.  She has not seen any provider for this recently.  I suggested follow-up with her PCP.  I also suggested empiric treatment with a PPI for about 3 months and then GI evaluation if no improvement.  I told her some names of over-the-counter PPIs that she could obtain.  She could also see a gastroenterologist sooner if she desires.  Again, I also suggested that she discuss further with her PCP.    Hypercalcemia  Assessment & Plan  H/o hypercalcemia; mild and chronic  Calcium within normal limits on today's labs    Abnormal EKG  Assessment & Plan  Noted on PAT eval  Cardiology consult was appreciated, \"Her EKG shows normal sinus rhythm with 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  There are no prior EKGs available for comparison.  The EKG is not necessarily pathologic and she has no concerning cardiac symptoms.  Therefore she is acceptable cardiac risk to proceed with her upcoming surgery.  We did discuss that at some point she may wish to obtain an echocardiogram but it does not necessary to have it performed prior to her upcoming surgery\"  - Outpatient follow-up w/ PCP/Cardiology w/ outpatient echocardiogram    Primary hypertension  Assessment & Plan  Creatinine stable.  Blood pressure stable.  Continue ACE inhibitor with hold parameters    Hyperlipidemia  Assessment & Plan  - Continue Crestor    Depression  Assessment & Plan  - Continue Effexor         Family at bedside     Ayaz Ivey, DO  2/28/2024             "

## 2024-02-28 NOTE — PROGRESS NOTES
Physical Therapy -  Daily Treatment/Progress Note     Patient: Bessy Newman  Location: [facility] [id number]  MRN: [id number]  Today's date: 2/28/2024    HISTORY OF PRESENT ILLNESS     Bessy is a 78 y.o. female admitted on 2/27/2024 with Primary osteoarthritis of left knee [M17.12]  S/P left knee surgery [Z98.890]. Principal problem is S/P left knee surgery.    Past Medical History  Bessy has a past medical history of Arthritis, Bronchitis, Depression, Dry eye, Hypertension, Kidney stone, Lipid disorder, Migraine headache, Osteopenia, and Seasonal allergies.    History of Present Illness   S/P L TKA on 2/27/24    PRIOR LEVEL OF FUNCTION AND LIVING ENVIRONMENT     Prior Level of Function    Flowsheet Row Most Recent Value   Dominant Hand right   Ambulation independent   Transferring independent   Toileting independent   Bathing independent   Dressing independent   Eating independent   IADLs independent   Driving/Transportation other (see comments)  [Pt reports that she does not drive]   Communication understands/communicates without difficulty   Prior Level of Function Comment Pt reports independence with ADLs/IADLs at baseline, no AD, -    Assistive Device Currently Used at Home cane, straight        Prior Living Environment    Flowsheet Row Most Recent Value   People in Home facility resident   Current Living Arrangements apartment   Living Environment Comment Pt lives in 1 story apt in independent living facility, no steps to enter, no steps inside, stall shower, curb outside to step up onto        VITALS AND PAIN     PT Vitals    Date/Time Pulse HR Source BP [facility]   02/28/24 1219 78 Monitor 137/62 MTC      PT Pain    Date/Time Side/Orientation Location Rating: Rest Rating: Activity Interventions [facility]   02/28/24 1219 left knee 3 4 position adjusted MTC           Objective   OBJECTIVE     Start time:  1208  End time:  1337  Session Length: 89 min  Mode of Treatment: group therapy, physical  therapy    General Observations  Patient received in therapy gym. She was agreeable to therapy, no issues or concerns identified by nurse prior to session. in gym    Precautions: fall, weight bearing  Extremity Weight-Bearing Status: left lower extremity  Left LE Weight-Bearing Status: weight-bearing as tolerated (WBAT)           PT Eval and Treat - 02/28/24 1208        Cognition    Orientation Status oriented x 4     Affect/Mental Status WFL     Follows Commands WFL     Cognitive Function WFL        Lower Extremity Range of Motion    Knee, Left (ROM) AROM 7-93        Mobility Belt    Mobility Belt Used for All Out of Bed Activity no     Reason Mobility Belt Not Used other (see comments)     Reason Mobility Belt Not Used Mod I        Sit/Stand Transfer    [place] modified independence     Assistive Device walker, front-wheeled        Car Transfer    Transfer Technique stand-sit     [place] distant supervision     Safety/Cues increased time to complete;technique;sequencing     Assistive Device walker, front-wheeled     Comment VC +TC's needed for proper technique w/ hand placement for transfers. Tendency to pull up from the walker. Verbalized understanding of techniques educated.        Gait Training    [place], Gait distant supervision     Safety/Cues increased time to complete;sequencing;technique     Assistive Device walker, front-wheeled     Distance in Feet 120 feet     Pattern step-through;step-to     Deviations/Abnormal Patterns antalgic;gait speed decreased;step length decreased;stride length decreased;weight shifting decreased     Comment (Gait/Stairs) Vc's for heel strike and toe off, with step-through sequencing technique. Verbalized understanding of techniques educated.        Balance    Static Sitting Balance WFL     Dynamic Sitting Balance WFL     Sit to Stand Dynamic Balance WFL     Static Standing Balance WFL     Dynamic Standing Balance WFL     Comment, Balance No overt LOB;'s         Lower Extremity (Therapeutic Exercise)    Exercise Position/Type seated     General Exercise bilateral;ankle pumps;quad sets;LAQ (long arc quad);heel slides;gluteal sets     Reps and Sets x10     Comment Pt educated on TE's to perform to increase functional ROM and strength of BLE's.                  10 Meter Walk Test (Self-Selected Velocity)  Trial One: Ten Meter Walk Test (sec): 17 seconds  Trial Two: Ten Meter Walk Test (sec): 15 seconds  Trial One: Gait Speed (m/s): 0.35 m/s  Trial Two: Gait Speed (m/s): 0.4 m/s  Average Gait Speed (m/s): Two Trials: 0.38 m/s                Session Outcome  Patient in therapy gym at end of session, returned to room by therapy aide. Nursing notified about patient's performance.    AM-PAC™ - Mobility (Current Function)     Turning form your back to your side while in flat bed without using bedrails 4 - None   Moving from lying on your back to sitting on the side of a flat bed without using bedrails 4 - None   Moving to and from a bed to a chair 4 - None   Standing up from a chair using your arms 4 - None   To walk in a hospital room 4 - None   Climbing 3-5 steps with a railing 4 - None   AM-PAC™ Mobility Score 24      ASSESSMENT AND PLAN     Progress Summary  Training conducted with negotiation of safe functional mobility techniques which included gait w/ RW and transfer techniques. Pt. demo good carryover with instructions. Also educated in application of surgical precautions with daily activities/mobility. Educated on TE's to assist with recovery following surgery to promote healing and strengthening of BLE's. Pt's family not present for treatment. Pt educated on how family can provide assistance / supervision for safety. Pt cleared PT gym.    Patient/Family Therapy Goals Statement: go home    PT Plan    Flowsheet Row Most Recent Value   Rehab Potential good, to achieve stated therapy goals at 02/28/2024 0833   Therapy Frequency daily at 02/28/2024 0833   Planned Therapy  Interventions transfer training, ROM (range of motion), gait training, home exercise program, patient/family education at 02/28/2024 0833          PT Discharge Recommendations    Flowsheet Row Most Recent Value   PT Recommended Discharge Disposition home with assistance at 02/28/2024 0833   Anticipated Equipment Needs if Discharged Home (PT) walker, front-wheeled  [RW issued-verbalizes understanding of proper care/maintenance and use of RW after demonstration/instruction.] at 02/28/2024 1208          Therapy Durable Medical Equipment  Vendor: [employer]  Equipment Provided: Walker, with Wheels, Adult     PT Goals    Flowsheet Row Most Recent Value   Transfer Goal 1    Activity/Assistive Device all transfers at 02/28/2024 0833   [place] modified independence at 02/28/2024 0833   Time Frame short-term goal (STG) at 02/28/2024 0833   Progress/Outcome goal ongoing at 02/28/2024 0833   Gait Training Goal 1    Activity/Assistive Device gait (walking locomotion) at 02/28/2024 0833   [place] modified independence at 02/28/2024 0833   Distance 100 at 02/28/2024 0833   Time Frame short-term goal (STG) at 02/28/2024 0833   Progress/Outcome goal ongoing at 02/28/2024 0833   ROM Goal 1    ROM Goal 1 AROM L knee flex ext 0-90 at 02/28/2024 0833   Time Frame short-term goal (STG) at 02/28/2024 0833   Progress/Outcome goal ongoing at 02/28/2024 0833

## 2024-02-28 NOTE — PLAN OF CARE
Plan of Care Review  Plan of Care Reviewed With: patient  Progress: improving  Outcome Evaluation: Assist x1 OOB w RW. Mepilex intact. Pain malinda reviewed with patient. Plan for d/c. Voiding w/o difficulty.

## 2024-02-28 NOTE — PLAN OF CARE
Problem: Adult Inpatient Plan of Care  Goal: Plan of Care Review  Outcome: Progressing  Flowsheets (Taken 2/28/2024 1137)  Progress: improving  Outcome Evaluation:   2/28/24 Pt is a 78 year old female s/p L TKA post op day one. pt seen for PT session. The patient was educated on the role of PT in the acute care setting. The patient was instructed on proper technique when performing OOB transfers to ensure their safety. Gait training was performed using the necessary AD along with instructing the patient regarding the proper set up and use of the device. All questions and concerns addressed and answered during session. Pt tolerated session well  The pt can complete functional tasks including performing transfers from low surface using RW without assist   ambulating well with supervision. Pt to attend ortho class and return home at [place]  Plan of Care Reviewed With: patient

## 2024-02-28 NOTE — PROGRESS NOTES
Orthopedic Surgery Postoperative Note    Subjective: Resting comfortably, awaiting PT eval    Objective:  Vitals:    02/28/24 0725   BP: (!) 103/53   Pulse: 80   Resp: 16   Temp: 36.7 °C (98 °F)   SpO2: 93%       Physical Exam:  Afebrile  Follows commands  Symmetric chest rise bilaterally with normal respiratory effort    Musculoskeletal:    Left Lower Extremity:  Dressings clean, dry, intact  Fires tibialis anterior, extensor hallucis longus, gastrocnemius/soleus  Sensation intact to light touch in superficial peroneal, deep peroneal, saphenous, sural, tibial nerve distributions  Palpable dorsalis pedis pulse    Assessment & Plan:  78 y.o. female s/p L TKA    - POD: 1 Day Post-Op  - Pain control  - Weight bearing status: WBAT  - DVT ppx  - PT/OT

## 2024-10-30 ENCOUNTER — APPOINTMENT (RX ONLY)
Dept: URBAN - METROPOLITAN AREA CLINIC 28 | Facility: CLINIC | Age: 79
Setting detail: DERMATOLOGY
End: 2024-10-30

## 2024-10-30 DIAGNOSIS — L82.1 OTHER SEBORRHEIC KERATOSIS: ICD-10-CM

## 2024-10-30 DIAGNOSIS — D18.0 HEMANGIOMA: ICD-10-CM

## 2024-10-30 DIAGNOSIS — Z85.828 PERSONAL HISTORY OF OTHER MALIGNANT NEOPLASM OF SKIN: ICD-10-CM

## 2024-10-30 DIAGNOSIS — L21.8 OTHER SEBORRHEIC DERMATITIS: ICD-10-CM | Status: INADEQUATELY CONTROLLED

## 2024-10-30 DIAGNOSIS — Z12.83 ENCOUNTER FOR SCREENING FOR MALIGNANT NEOPLASM OF SKIN: ICD-10-CM

## 2024-10-30 DIAGNOSIS — D22 MELANOCYTIC NEVI: ICD-10-CM

## 2024-10-30 DIAGNOSIS — L82.0 INFLAMED SEBORRHEIC KERATOSIS: ICD-10-CM

## 2024-10-30 DIAGNOSIS — L81.4 OTHER MELANIN HYPERPIGMENTATION: ICD-10-CM

## 2024-10-30 PROBLEM — D18.01 HEMANGIOMA OF SKIN AND SUBCUTANEOUS TISSUE: Status: ACTIVE | Noted: 2024-10-30

## 2024-10-30 PROBLEM — D22.5 MELANOCYTIC NEVI OF TRUNK: Status: ACTIVE | Noted: 2024-10-30

## 2024-10-30 PROCEDURE — 17110 DESTRUCTION B9 LES UP TO 14: CPT

## 2024-10-30 PROCEDURE — ? FULL BODY SKIN EXAM

## 2024-10-30 PROCEDURE — ? SUNSCREEN RECOMMENDATIONS

## 2024-10-30 PROCEDURE — ? BENIGN DESTRUCTION

## 2024-10-30 PROCEDURE — ? PRESCRIPTION MEDICATION MANAGEMENT

## 2024-10-30 PROCEDURE — 99214 OFFICE O/P EST MOD 30 MIN: CPT | Mod: 25

## 2024-10-30 PROCEDURE — ? COUNSELING

## 2024-10-30 ASSESSMENT — LOCATION SIMPLE DESCRIPTION DERM
LOCATION SIMPLE: LEFT ANTERIOR NECK
LOCATION SIMPLE: LEFT SCALP
LOCATION SIMPLE: UPPER BACK

## 2024-10-30 ASSESSMENT — LOCATION DETAILED DESCRIPTION DERM
LOCATION DETAILED: LEFT MEDIAL FRONTAL SCALP
LOCATION DETAILED: SUPERIOR THORACIC SPINE
LOCATION DETAILED: LEFT CLAVICULAR NECK

## 2024-10-30 ASSESSMENT — LOCATION ZONE DERM
LOCATION ZONE: TRUNK
LOCATION ZONE: SCALP
LOCATION ZONE: NECK

## 2024-10-30 NOTE — PROCEDURE: BENIGN DESTRUCTION
Medical Necessity Clause: This procedure was medically necessary because the lesions that were treated were:
Render Note In Bullet Format When Appropriate: No
Medical Necessity Information: It is in your best interest to select a reason for this procedure from the list below. All of these items fulfill various CMS LCD requirements except the new and changing color options.
Post-Care Instructions: I reviewed with the patient in detail post-care instructions. Patient is to wear sunprotection, and avoid picking at any of the treated lesions. Pt may apply Vaseline to crusted or scabbing areas.
Anesthesia Volume In Cc: 0.5
Detail Level: Detailed
Treatment Number (Will Not Render If 0): 0
Consent: The patient's consent was obtained including but not limited to risks of crusting, scabbing, blistering, scarring, darker or lighter pigmentary change, recurrence, incomplete removal and infection.

## 2024-10-30 NOTE — PROCEDURE: PRESCRIPTION MEDICATION MANAGEMENT
Detail Level: Zone
Discontinue Regimen: ketoconazole 2% shampoo: During each hair wash Massage a thin layer onto scalp, let sit for 5-10 minutes then rinse (patient declines and never used)
Render In Strict Bullet Format?: No

## 2025-04-29 NOTE — PROGRESS NOTES
Occupational Therapy -  Initial Evaluation     Patient: Bessy Newman  Location: [facility] 5PAV [id number]  MRN: [id number]  Today's date: 2/28/2024    HISTORY OF PRESENT ILLNESS     Bessy is a 78 y.o. female admitted on 2/27/2024 with Primary osteoarthritis of left knee [M17.12]  S/P left knee surgery [Z98.890]. Principal problem is S/P left knee surgery.    Past Medical History  Bessy has a past medical history of Arthritis, Bronchitis, Depression, Dry eye, Hypertension, Kidney stone, Lipid disorder, Migraine headache, Osteopenia, and Seasonal allergies.    History of Present Illness   S/P L TKA on 2/27/24    PRIOR LEVEL OF FUNCTION AND LIVING ENVIRONMENT     Prior Level of Function    Flowsheet Row Most Recent Value   Dominant Hand right   Ambulation independent   Transferring independent   Toileting independent   Bathing independent   Dressing independent   Eating independent   IADLs independent   Driving/Transportation other (see comments)  [Pt reports that she does not drive]   Communication understands/communicates without difficulty   Prior Level of Function Comment Pt reports independence with ADLs/IADLs at baseline, no AD, -    Assistive Device Currently Used at Home cane, straight        Prior Living Environment    Flowsheet Row Most Recent Value   People in Home facility resident   Current Living Arrangements apartment   Living Environment Comment Pt lives in 1 story apt in independent living facility, no steps to enter, no steps inside, stall shower, curb outside to step up onto        Occupational Profile    Flowsheet Row Most Recent Value   Successful Occupations Pt enjoys quilting   Occupational History/Life Experiences Pt was a    Environmental Supports and Barriers Supportive daughter at bedside        VITALS AND PAIN     OT Vitals    Date/Time Pulse HR Source SpO2 Pt Activity O2 Therapy BP BP Location BP Method Pt Position [facility]   02/28/24 1015 80 Monitor 94 % At rest None (Room  April 29, 2025    Avel Núñez  [address] [facility] [address]             [facility]  Pediatric Optometry  [address]  [place] [address]  Phone: [phone]  Fax: [phone]   April 29, 2025     Patient: Avel Núñez   YOB: 2012   Date of Visit: 4/29/2025       To Whom it May Concern:    Avel Núñez was seen in my clinic on 4/29/2025. He may return to school on 4/30/2025 .Please allow more time for and assist with all near work, as Avel's pupils were dilated.      Please excuse him from any classes or work missed.    If you have any questions or concerns, please don't hesitate to call.    Sincerely,               Lorna Thornton OD, MS  Pediatric Optometrist  Director of Pediatric Optometric Services  [facility]       air) 107/57 Left upper arm Automatic Sitting [facility]B   02/28/24 1041 82 Monitor 94 % At rest None (Room air) 122/58 Left upper arm Automatic Sitting MRB      OT Pain    Date/Time Pain Type Side/Orientation Location Rating: Rest Rating: Activity Interventions [facility]   02/28/24 1015 Pain Assessment left knee 3 4 position adjusted MRB           Objective   OBJECTIVE     Start time:  1015  End time:  1041  Session Length: 26 min  Mode of Treatment: individual therapy, occupational therapy    General Observations  Patient received in chair, reclined. She was agreeable to therapy, no issues or concerns identified by nurse prior to session. Pt awake and alert, received with daughter at bedside    Precautions: fall, weight bearing  Extremity Weight-Bearing Status: left lower extremity  Left LE Weight-Bearing Status: weight-bearing as tolerated (WBAT)           OT Eval and Treat - 02/28/24 1015        Cognition    Orientation Status oriented x 4     Affect/Mental Status WFL     Follows Commands WFL     Cognitive Function WFL     Comment, Cognition Pt very pleasant and cooperative        Vision Assessment/Intervention    Vision Assessment corrective lenses for reading        Hearing Assessment    Hearing Status WFL        Sensory Assessment    Sensory Assessment sensation intact, upper extremities        Upper Extremity Assessment    UE Assessment ROM and Strength WFL        Mobility Belt    Mobility Belt Used for All Out of Bed Activity yes        Sit/Stand Transfer    Surface chair with arm rests     [place] close supervision     Assistive Device walker, front-wheeled     Transfer Comments Pt completed sit to stand transfer from chair to RW with CLS for safety        Toilet Transfer    Transfer Technique sit/stand     [place] supervision     Assistive Device grab bars/safety frame     Comment Pt completed toilet transfer, supervision level for safety, utilized grab bars around toilet for support.        Functional  Mobility    Distance in room/bathroom     Functional Mobility [place] close supervision     Assistive Device walker, front-wheeled     Functional Mobility Comments Pt ambulated in room with CLS for safety with RW, no LOB noted        Upper Body Dressing    Tasks don;front-opening garment;pull over garment;doff;hospital gown     [place] independent     Position unsupported sitting     Adaptive Equipment none     Comment Pt independent in [facility] gown, donning pull over tank top, donning front opening shirt while seated in chair.        Lower Body Dressing    Tasks doff;socks;don;pants/bottoms;underwear     [place] close supervision;moderate assist (50-74% patient effort);1 person assist     Safety/Cues verbal cues;sequencing     Position unsupported sitting;unsupported standing     Adaptive Equipment none     Comment Pt able to don socks with mod A x1, pt was able to reach down to put socks on but required A for adjusting them/pulling socks fully onto feet. Pt educated on safe dressing techniques including dressing surgical side first and donning underwear/pants at the same time before pulling them up. Pt CLS for donning underwear and pants. Pt independent in [facility] gown and donning t shirt and front opening cardigan.        Grooming    Tasks washes, rinses and dries hands;oral care (brushing teeth, cleaning dentures)     [place] supervision     Position sink side     Adaptive Equipment none     Comment Pt supervision for safety while washing hands and brushing teeth while standing sinkside with RW        Toileting    Tasks perform bladder hygiene;adjust/manage clothing     [place] independent     Position unsupported sitting     Adaptive Equipment none     Comment Once safely seated on toilet, pt able to independently complete toileting and bladder hygiene.        Balance    Static Sitting Balance WFL     Dynamic Sitting Balance WFL     Sit to Stand Dynamic Balance mild  impairment     Static Standing Balance WFL     Dynamic Standing Balance mild impairment     Comment, Balance Pt using RW and CLS for safety, mild balance deficits noted during transfers and mobility.        Impairments/Safety Issues    Impairments Affecting Function pain;endurance/activity tolerance;range of motion (ROM);balance     Comment, Safety Issues/Impairments Pt overall limited by pain, ROM, activity tolerance, and mild balance deficits.                                Education Documentation  Treatment Plan, taught by Rimma Marquez at 2/28/2024 12:10 PM.  Learner: Patient  Readiness: Acceptance  Method: Explanation  Response: Verbalizes Understanding  Comment: OT role, POC, safe transfer techniques, safe dressing techniques        Session Outcome  Patient reclined, in chair at end of session, all needs met, chair alarm on, call light in reach, personal items in reach. Nursing notified about patient's performance and patient's response to therapy/activity.    AM-PAC™ - ADL (Current Function)     Putting on/taking off regular lower body clothing 3 - A Little (Pt would benefit from education on sock aid)   Bathing 3 - A Little   Toileting 3 - A Little   Putting on/taking off regular upper body clothing 4 - None   Help for taking care of personal grooming 4 - None   Eating meals 4 - None   AM-PAC™ ADL Score 21      ASSESSMENT AND PLAN     Progress Summary  OT eval complete. Pt receieved with daughter at bedside, very pleasant and cooperative throughout session. Pt CLS for mobility and transfers with use of RW, no LOB noted. Pt completed sit to stand transfer and ambulated into bathroom with RW, mild balance deficits noted during transfers and mobility but stabilized with RW. Pt brushed teeth and washed hands while standing sinkside with supervision for safety. Pt CLS for toilet transfer using grab bars, independent for toileting and bladder hygiene once safely seated. Pt completed upper body dressing  independently while seated in chair. Pt educated on safe dressing techniques including donning affected side first and donning underwear/pants at the same time. Pt able to don socks with mod A x1, pt able to reach down and put socks on but required A to fully pull them on her feet. Pt CLS for donning underwear and pants for safety purposes. Pt overall limited by pain, ROM, activity tolerance, and mild balance deficits. Pt would continue to benefit from OT services to increase safety and independence. Rec d/c back to living facility with A.    Patient/Family Therapy Goal Statement: to go home safely    OT Plan    Flowsheet Row Most Recent Value   Rehab Potential good, to achieve stated therapy goals at 02/28/2024 1015   Therapy Frequency 5 times/wk at 02/28/2024 1015   Planned Therapy Interventions occupation/activity based interventions, transfer/mobility retraining, functional balance retraining, adaptive equipment training, BADL retraining at 02/28/2024 1015          OT Discharge Recommendations    Flowsheet Row Most Recent Value   OT Recommended Discharge Disposition home with assistance at 02/28/2024 1015   Anticipated Equipment Needs if Discharged Home (OT) dressing equipment, bathing equipment, reacher, walker, front-wheeled at 02/28/2024 1015               OT Goals    Flowsheet Row Most Recent Value   Bed Mobility Goal 1    Activity/Assistive Device bed mobility activities, all at 02/28/2024 1015   [place] modified independence at 02/28/2024 1015   Time Frame by discharge at 02/28/2024 1015   Progress/Outcome goal ongoing at 02/28/2024 1015   Transfer Goal 1    Activity/Assistive Device sit-to-stand/stand-to-sit, toilet at 02/28/2024 1015   [place] supervision required at 02/28/2024 1015   Time Frame by discharge at 02/28/2024 1015   Progress/Outcome goal ongoing at 02/28/2024 1015   Transfer Goal 2    Activity/Assistive Device shower at 02/28/2024 1015   [place] supervision required at  02/28/2024 1015   Time Frame by discharge at 02/28/2024 1015   Progress/Outcome goal ongoing at 02/28/2024 1015   Dressing Goal 1    Activity/Adaptive Equipment lower body dressing at 02/28/2024 1015   [place] modified independence at 02/28/2024 1015   Time Frame by discharge at 02/28/2024 1015   Progress/Outcome goal ongoing at 02/28/2024 1015

## (undated) DEVICE — BLANKET UPPER BODY BAIR HUGGER

## (undated) DEVICE — PAD GROUND ELECTROSURGICAL W/CORD

## (undated) DEVICE — SLEEVE SCD COMFORT KNEE LENGTH MED

## (undated) DEVICE — HOOD T7 PLUS

## (undated) DEVICE — NEEDLE DISP HYPO 22GX1-1/2IN

## (undated) DEVICE — HOOD T7 PLUS W/PEEL AWAY SHIELD

## (undated) DEVICE — BLADE SAGITTAL 25 X 1.27 X 90MM

## (undated) DEVICE — ADHESIVE SKIN DERMABOND ADVANCED 0.7ML

## (undated) DEVICE — PACK TOTAL JOINT

## (undated) DEVICE — DRAPE U/ SHT SPLIT PLASTIC ST 24/CS

## (undated) DEVICE — SUTURE POLYSORB 2-0 UNDYED 1X30 P-17

## (undated) DEVICE — COVER LIGHTHANDLE (STERILE SINGLE PA

## (undated) DEVICE — SET HANDPIECE INTERPULSE

## (undated) DEVICE — TUBING SMOKE EVAC PENCIL COATED

## (undated) DEVICE — BANDAGE ESMARK 6X12 LATEX FREE

## (undated) DEVICE — APPLICATOR CHLORAPREP 26ML ORANGE TINT

## (undated) DEVICE — SUTURE MONOCRYL 4-0  Y496G PS-2 I8IN

## (undated) DEVICE — PIN TROCAR DRILL HEADLESS 3.2X75 MM
Type: IMPLANTABLE DEVICE | Site: KNEE | Status: NON-FUNCTIONAL
Removed: 2024-02-27

## (undated) DEVICE — SWABSTICK BETADINE

## (undated) DEVICE — BLADE SAGITTAL #127 STABLECUT

## (undated) DEVICE — DRESSING OPTIFOAM 4 X 10IN POST-OP

## (undated) DEVICE — SUTURE POLYSORB 1 UNDYED 1X30 GS-12

## (undated) DEVICE — GLOVE SZ 8 LINER PROTEXIS PI BL

## (undated) DEVICE — SOLN IRRIG STER WATER 1000ML

## (undated) DEVICE — MANIFOLD FOUR PORT NEPTUNE

## (undated) DEVICE — GOWN SIRUS FABRNF RAGLAN XL ST 28/CS

## (undated) DEVICE — GLOVE SZ 7.5 PROTEXIS PI

## (undated) DEVICE — SCREW FEMALE PERSONA 2.5MM X 25MM
Type: IMPLANTABLE DEVICE | Site: KNEE | Status: NON-FUNCTIONAL
Removed: 2024-02-27

## (undated) DEVICE — STOCKINETTE 8IN STERILE MEDC

## (undated) DEVICE — GLOVE SZ 8 PROTEXIS PI

## (undated) DEVICE — SUTURE STRATAFIX PDO 1 CTX TAPER 36CM

## (undated) DEVICE — MAT FLOOR QUICK WICK 30X40

## (undated) DEVICE — DRAPE-U NON-STERILE

## (undated) DEVICE — VEST STERILE

## (undated) DEVICE — KIT CEMENT MIXING CARTRIDGE AND NOZZLE

## (undated) DEVICE — SOLN IRRIG .9%SOD 3L

## (undated) DEVICE — CUFF TOURNIQUET DISP 34 X 4